# Patient Record
Sex: MALE | Race: BLACK OR AFRICAN AMERICAN | URBAN - METROPOLITAN AREA
[De-identification: names, ages, dates, MRNs, and addresses within clinical notes are randomized per-mention and may not be internally consistent; named-entity substitution may affect disease eponyms.]

---

## 2017-01-01 ENCOUNTER — INPATIENT (INPATIENT)
Facility: HOSPITAL | Age: 82
LOS: 3 days | Discharge: HOME HEALTH | End: 2017-01-05
Attending: INTERNAL MEDICINE | Admitting: INTERNAL MEDICINE
Payer: MEDICARE

## 2017-01-01 VITALS
HEART RATE: 131 BPM | TEMPERATURE: 98 F | DIASTOLIC BLOOD PRESSURE: 52 MMHG | OXYGEN SATURATION: 99 % | WEIGHT: 130.07 LBS | SYSTOLIC BLOOD PRESSURE: 97 MMHG | HEIGHT: 67 IN | RESPIRATION RATE: 18 BRPM

## 2017-01-01 DIAGNOSIS — Z92.89 PERSONAL HISTORY OF OTHER MEDICAL TREATMENT: Chronic | ICD-10-CM

## 2017-01-01 LAB
ACANTHOCYTES BLD QL SMEAR: SIGNIFICANT CHANGE UP
ALBUMIN SERPL ELPH-MCNC: 2.7 G/DL — LOW (ref 3.3–5)
ALP SERPL-CCNC: 80 U/L — SIGNIFICANT CHANGE UP (ref 40–120)
ALT FLD-CCNC: 27 U/L — SIGNIFICANT CHANGE UP (ref 12–78)
ANION GAP SERPL CALC-SCNC: 12 MMOL/L — SIGNIFICANT CHANGE UP (ref 5–17)
ANISOCYTOSIS BLD QL: SLIGHT — SIGNIFICANT CHANGE UP
APPEARANCE UR: ABNORMAL
AST SERPL-CCNC: 40 U/L — HIGH (ref 15–37)
BACTERIA # UR AUTO: ABNORMAL
BILIRUB SERPL-MCNC: 0.8 MG/DL — SIGNIFICANT CHANGE UP (ref 0.2–1.2)
BILIRUB UR-MCNC: NEGATIVE — SIGNIFICANT CHANGE UP
BUN SERPL-MCNC: 104 MG/DL — HIGH (ref 7–23)
BURR CELLS BLD QL SMEAR: PRESENT — SIGNIFICANT CHANGE UP
CALCIUM SERPL-MCNC: 9 MG/DL — SIGNIFICANT CHANGE UP (ref 8.5–10.1)
CHLORIDE SERPL-SCNC: 95 MMOL/L — LOW (ref 96–108)
CO2 SERPL-SCNC: 27 MMOL/L — SIGNIFICANT CHANGE UP (ref 22–31)
COLOR SPEC: ABNORMAL
CREAT SERPL-MCNC: 6.53 MG/DL — HIGH (ref 0.5–1.3)
DACRYOCYTES BLD QL SMEAR: SLIGHT — SIGNIFICANT CHANGE UP
DIFF PNL FLD: ABNORMAL
DOHLE BOD BLD QL SMEAR: PRESENT — SIGNIFICANT CHANGE UP
ELLIPTOCYTES BLD QL SMEAR: SLIGHT — SIGNIFICANT CHANGE UP
EPI CELLS # UR: SIGNIFICANT CHANGE UP
GLUCOSE SERPL-MCNC: 94 MG/DL — SIGNIFICANT CHANGE UP (ref 70–99)
GLUCOSE UR QL: NEGATIVE MG/DL — SIGNIFICANT CHANGE UP
HCT VFR BLD CALC: 32.3 % — LOW (ref 39–50)
HGB BLD-MCNC: 11.2 G/DL — LOW (ref 13–17)
HYPOCHROMIA BLD QL: SLIGHT — SIGNIFICANT CHANGE UP
HYPOSEGMENTATION: PRESENT — SIGNIFICANT CHANGE UP
KETONES UR-MCNC: NEGATIVE — SIGNIFICANT CHANGE UP
LACTATE SERPL-SCNC: 1.6 MMOL/L — SIGNIFICANT CHANGE UP (ref 0.7–2)
LEUKOCYTE ESTERASE UR-ACNC: ABNORMAL
LG PLATELETS BLD QL AUTO: SLIGHT — SIGNIFICANT CHANGE UP
LYMPHOCYTES # BLD AUTO: 6 % — LOW (ref 13–44)
MACROCYTES BLD QL: SLIGHT — SIGNIFICANT CHANGE UP
MCHC RBC-ENTMCNC: 29.9 PG — SIGNIFICANT CHANGE UP (ref 27–34)
MCHC RBC-ENTMCNC: 34.7 GM/DL — SIGNIFICANT CHANGE UP (ref 32–36)
MCV RBC AUTO: 86.1 FL — SIGNIFICANT CHANGE UP (ref 80–100)
MICROCYTES BLD QL: SIGNIFICANT CHANGE UP
MONOCYTES NFR BLD AUTO: 4 % — SIGNIFICANT CHANGE UP (ref 2–14)
NEUTROPHILS NFR BLD AUTO: 90 % — HIGH (ref 43–77)
NITRITE UR-MCNC: NEGATIVE — SIGNIFICANT CHANGE UP
OVALOCYTES BLD QL SMEAR: SLIGHT — SIGNIFICANT CHANGE UP
PH UR: 6.5 — SIGNIFICANT CHANGE UP (ref 4.8–8)
PLAT MORPH BLD: NORMAL — SIGNIFICANT CHANGE UP
PLATELET # BLD AUTO: 194 K/UL — SIGNIFICANT CHANGE UP (ref 150–400)
POLYCHROMASIA BLD QL SMEAR: SLIGHT — SIGNIFICANT CHANGE UP
POTASSIUM SERPL-MCNC: 5.3 MMOL/L — SIGNIFICANT CHANGE UP (ref 3.5–5.3)
POTASSIUM SERPL-SCNC: 5.3 MMOL/L — SIGNIFICANT CHANGE UP (ref 3.5–5.3)
PROT SERPL-MCNC: 7.1 GM/DL — SIGNIFICANT CHANGE UP (ref 6–8.3)
PROT UR-MCNC: 500 MG/DL
RBC # BLD: 3.75 M/UL — LOW (ref 4.2–5.8)
RBC # FLD: 14.1 % — SIGNIFICANT CHANGE UP (ref 11–15)
RBC BLD AUTO: ABNORMAL
RBC CASTS # UR COMP ASSIST: SIGNIFICANT CHANGE UP /HPF (ref 0–4)
SCHISTOCYTES BLD QL AUTO: SLIGHT — SIGNIFICANT CHANGE UP
SODIUM SERPL-SCNC: 134 MMOL/L — LOW (ref 135–145)
SP GR SPEC: 1.01 — SIGNIFICANT CHANGE UP (ref 1.01–1.02)
SPHEROCYTES BLD QL SMEAR: SLIGHT — SIGNIFICANT CHANGE UP
UROBILINOGEN FLD QL: NEGATIVE MG/DL — SIGNIFICANT CHANGE UP
WBC # BLD: 20 K/UL — HIGH (ref 3.8–10.5)
WBC # FLD AUTO: 20 K/UL — HIGH (ref 3.8–10.5)
WBC UR QL: SIGNIFICANT CHANGE UP /HPF (ref 0–5)

## 2017-01-01 PROCEDURE — 71010: CPT | Mod: 26

## 2017-01-01 PROCEDURE — 99285 EMERGENCY DEPT VISIT HI MDM: CPT

## 2017-01-01 RX ORDER — PIPERACILLIN AND TAZOBACTAM 4; .5 G/20ML; G/20ML
3.38 INJECTION, POWDER, LYOPHILIZED, FOR SOLUTION INTRAVENOUS ONCE
Qty: 0 | Refills: 0 | Status: COMPLETED | OUTPATIENT
Start: 2017-01-01 | End: 2017-01-01

## 2017-01-01 RX ORDER — VANCOMYCIN HCL 1 G
1000 VIAL (EA) INTRAVENOUS ONCE
Qty: 0 | Refills: 0 | Status: COMPLETED | OUTPATIENT
Start: 2017-01-01 | End: 2017-01-01

## 2017-01-01 RX ORDER — SODIUM CHLORIDE 9 MG/ML
2000 INJECTION INTRAMUSCULAR; INTRAVENOUS; SUBCUTANEOUS ONCE
Qty: 0 | Refills: 0 | Status: COMPLETED | OUTPATIENT
Start: 2017-01-01 | End: 2017-01-01

## 2017-01-01 RX ORDER — SODIUM CHLORIDE 9 MG/ML
1000 INJECTION INTRAMUSCULAR; INTRAVENOUS; SUBCUTANEOUS ONCE
Qty: 0 | Refills: 0 | Status: COMPLETED | OUTPATIENT
Start: 2017-01-01 | End: 2017-01-01

## 2017-01-01 RX ORDER — SODIUM CHLORIDE 9 MG/ML
1000 INJECTION INTRAMUSCULAR; INTRAVENOUS; SUBCUTANEOUS ONCE
Qty: 0 | Refills: 0 | Status: COMPLETED | OUTPATIENT
Start: 2017-01-01 | End: 2017-01-02

## 2017-01-01 RX ADMIN — PIPERACILLIN AND TAZOBACTAM 200 GRAM(S): 4; .5 INJECTION, POWDER, LYOPHILIZED, FOR SOLUTION INTRAVENOUS at 16:35

## 2017-01-01 RX ADMIN — SODIUM CHLORIDE 1000 MILLILITER(S): 9 INJECTION INTRAMUSCULAR; INTRAVENOUS; SUBCUTANEOUS at 16:35

## 2017-01-01 RX ADMIN — SODIUM CHLORIDE 1000 MILLILITER(S): 9 INJECTION INTRAMUSCULAR; INTRAVENOUS; SUBCUTANEOUS at 21:08

## 2017-01-01 RX ADMIN — Medication 250 MILLIGRAM(S): at 17:45

## 2017-01-01 NOTE — ED PROVIDER NOTE - PROGRESS NOTE DETAILS
Patient signed out by Dr. Nunn pending ICU consult. ICU recommend CT and repeat CBC. ICU will not take patient at this time. CT reviewed. Dr. Gilbert consulted. Dr. Sanches paged. Patient now admitted to medicine for further management.

## 2017-01-01 NOTE — ED ADULT NURSE NOTE - PMH
BPH (benign prostatic hypertrophy)    Cardiac anomaly    CHF (congestive heart failure)    Hyperlipidemia    Hypertension

## 2017-01-01 NOTE — ED PROVIDER NOTE - PHYSICAL EXAMINATION
Gen: Alert, NAD Head: NC, AT, PERRL, EOMI, normal lids/conjunctiva ENT: normal hearing, patent oropharynx without erythema/exudate, uvula midline Neck: +supple, no tenderness/meningismus/JVD, +Trachea midline Pulm: Bilateral BS, normal resp effort, no wheeze/stridor/retractions CV: RRR, no M/R/G, +dist pulses Abd: soft, NT/ND, +BS, no hepatosplenomegaly Mskel: no edema/erythema/cyanosis Skin: no rash Neuro: Alert, no sensory/motor deficits, CN 2-12 intact : indwelling jc with heamturia

## 2017-01-01 NOTE — ED PROVIDER NOTE - CARE PLAN
Principal Discharge DX:	UTI (urinary tract infection)  Secondary Diagnosis:	Sepsis  Secondary Diagnosis:	Renal insufficiency

## 2017-01-01 NOTE — ED PROVIDER NOTE - OBJECTIVE STATEMENT
Pertinent PMH/PSH/FHx/SHx and Review of Systems contained within:  Patient presents to the ED for hypotension (systolic 90's).  Per chart, PMH of BPH, HTN, HLD, CAD, chronic jc, CRI.  Patient si poor historian.  unable to attain more detailed ROS/PMH/PSH/FHx/SHx due to clinical condition.  VSS but bordelrine hypotensive.  Non toxic.  Well appearing. No aggravating or relieving factors. No other pertinent PMH.  No other pertinent PSH.  No other pertinent FHx.  No fever/chills, No photophobia/eye pain/changes in vision, No ear pain/sore throat/dysphagia, No chest pain/palpitations, no SOB/cough/wheeze/stridor, No abdominal pain, No N/V/D, no dysuria/frequency/discharge, No neck/back pain, no rash, no changes in neurological status/function.

## 2017-01-01 NOTE — ED PROVIDER NOTE - MEDICAL DECISION MAKING DETAILS
Patient presnt with borderline hypotension.  VSS. improvement with IVF.  UA with UTI.  Lab values c/w likely sepsis.  abx given.  d/w Dr. An and will admit. Patient presnt with borderline hypotension.  VSS. improvement with IVF.  UA with UTI.  Lab values c/w likely sepsis.  abx given.

## 2017-01-01 NOTE — ED ADULT NURSE NOTE - OBJECTIVE STATEMENT
Pt arrived with jc in placed from home with hematuria noted. Pt hypotensive upon arrival to ED, denies any pain at this time.

## 2017-01-02 DIAGNOSIS — E46 UNSPECIFIED PROTEIN-CALORIE MALNUTRITION: ICD-10-CM

## 2017-01-02 DIAGNOSIS — N28.9 DISORDER OF KIDNEY AND URETER, UNSPECIFIED: ICD-10-CM

## 2017-01-02 DIAGNOSIS — N47.8 OTHER DISORDERS OF PREPUCE: ICD-10-CM

## 2017-01-02 DIAGNOSIS — A41.9 SEPSIS, UNSPECIFIED ORGANISM: ICD-10-CM

## 2017-01-02 DIAGNOSIS — N39.0 URINARY TRACT INFECTION, SITE NOT SPECIFIED: ICD-10-CM

## 2017-01-02 DIAGNOSIS — R31.0 GROSS HEMATURIA: ICD-10-CM

## 2017-01-02 DIAGNOSIS — I10 ESSENTIAL (PRIMARY) HYPERTENSION: ICD-10-CM

## 2017-01-02 DIAGNOSIS — I50.9 HEART FAILURE, UNSPECIFIED: ICD-10-CM

## 2017-01-02 DIAGNOSIS — N40.0 BENIGN PROSTATIC HYPERPLASIA WITHOUT LOWER URINARY TRACT SYMPTOMS: ICD-10-CM

## 2017-01-02 DIAGNOSIS — N30.01 ACUTE CYSTITIS WITH HEMATURIA: ICD-10-CM

## 2017-01-02 DIAGNOSIS — N40.1 BENIGN PROSTATIC HYPERPLASIA WITH LOWER URINARY TRACT SYMPTOMS: ICD-10-CM

## 2017-01-02 DIAGNOSIS — E78.5 HYPERLIPIDEMIA, UNSPECIFIED: ICD-10-CM

## 2017-01-02 LAB
ALBUMIN SERPL ELPH-MCNC: 2.1 G/DL — LOW (ref 3.3–5)
ALP SERPL-CCNC: 66 U/L — SIGNIFICANT CHANGE UP (ref 40–120)
ALT FLD-CCNC: 21 U/L — SIGNIFICANT CHANGE UP (ref 12–78)
ANION GAP SERPL CALC-SCNC: 12 MMOL/L — SIGNIFICANT CHANGE UP (ref 5–17)
APTT BLD: 31.6 SEC — SIGNIFICANT CHANGE UP (ref 27.5–37.4)
AST SERPL-CCNC: 30 U/L — SIGNIFICANT CHANGE UP (ref 15–37)
BASOPHILS # BLD AUTO: 0.1 K/UL — SIGNIFICANT CHANGE UP (ref 0–0.2)
BASOPHILS NFR BLD AUTO: 0.4 % — SIGNIFICANT CHANGE UP (ref 0–2)
BILIRUB SERPL-MCNC: 0.7 MG/DL — SIGNIFICANT CHANGE UP (ref 0.2–1.2)
BUN SERPL-MCNC: 90 MG/DL — HIGH (ref 7–23)
CALCIUM SERPL-MCNC: 8.1 MG/DL — LOW (ref 8.5–10.1)
CHLORIDE SERPL-SCNC: 103 MMOL/L — SIGNIFICANT CHANGE UP (ref 96–108)
CO2 SERPL-SCNC: 23 MMOL/L — SIGNIFICANT CHANGE UP (ref 22–31)
CREAT ?TM UR-MCNC: 44 MG/DL — SIGNIFICANT CHANGE UP
CREAT SERPL-MCNC: 5.4 MG/DL — HIGH (ref 0.5–1.3)
CULTURE RESULTS: SIGNIFICANT CHANGE UP
EOSINOPHIL # BLD AUTO: 0.2 K/UL — SIGNIFICANT CHANGE UP (ref 0–0.5)
EOSINOPHIL NFR BLD AUTO: 1.4 % — SIGNIFICANT CHANGE UP (ref 0–6)
EOSINOPHIL NFR URNS MANUAL: POSITIVE
GLUCOSE SERPL-MCNC: 90 MG/DL — SIGNIFICANT CHANGE UP (ref 70–99)
HCT VFR BLD CALC: 25.8 % — LOW (ref 39–50)
HCT VFR BLD CALC: 25.9 % — LOW (ref 39–50)
HGB BLD-MCNC: 9.5 G/DL — LOW (ref 13–17)
HGB BLD-MCNC: 9.5 G/DL — LOW (ref 13–17)
INR BLD: 1.41 RATIO — HIGH (ref 0.88–1.16)
LACTATE SERPL-SCNC: 1.2 MMOL/L — SIGNIFICANT CHANGE UP (ref 0.7–2)
LYMPHOCYTES # BLD AUTO: 1.4 K/UL — SIGNIFICANT CHANGE UP (ref 1–3.3)
LYMPHOCYTES # BLD AUTO: 7.9 % — LOW (ref 13–44)
MCHC RBC-ENTMCNC: 30.6 PG — SIGNIFICANT CHANGE UP (ref 27–34)
MCHC RBC-ENTMCNC: 30.8 PG — SIGNIFICANT CHANGE UP (ref 27–34)
MCHC RBC-ENTMCNC: 36.6 GM/DL — HIGH (ref 32–36)
MCHC RBC-ENTMCNC: 36.9 GM/DL — HIGH (ref 32–36)
MCV RBC AUTO: 83.3 FL — SIGNIFICANT CHANGE UP (ref 80–100)
MCV RBC AUTO: 83.5 FL — SIGNIFICANT CHANGE UP (ref 80–100)
MONOCYTES # BLD AUTO: 1.1 K/UL — HIGH (ref 0–0.9)
MONOCYTES NFR BLD AUTO: 6.3 % — SIGNIFICANT CHANGE UP (ref 2–14)
NEUTROPHILS # BLD AUTO: 14.6 K/UL — HIGH (ref 1.8–7.4)
NEUTROPHILS NFR BLD AUTO: 84 % — HIGH (ref 43–77)
NT-PROBNP SERPL-SCNC: HIGH PG/ML (ref 0–450)
PLATELET # BLD AUTO: 152 K/UL — SIGNIFICANT CHANGE UP (ref 150–400)
PLATELET # BLD AUTO: 161 K/UL — SIGNIFICANT CHANGE UP (ref 150–400)
POTASSIUM SERPL-MCNC: 4.3 MMOL/L — SIGNIFICANT CHANGE UP (ref 3.5–5.3)
POTASSIUM SERPL-SCNC: 4.3 MMOL/L — SIGNIFICANT CHANGE UP (ref 3.5–5.3)
PROT ?TM UR-MCNC: 13 MG/DL — HIGH (ref 0–12)
PROT SERPL-MCNC: 5.6 GM/DL — LOW (ref 6–8.3)
PROT/CREAT UR-RTO: 0.3 RATIO — HIGH (ref 0–0.2)
PROTHROM AB SERPL-ACNC: 15.9 SEC — HIGH (ref 10–13.1)
RBC # BLD: 3.1 M/UL — LOW (ref 4.2–5.8)
RBC # BLD: 3.1 M/UL — LOW (ref 4.2–5.8)
RBC # FLD: 13.3 % — SIGNIFICANT CHANGE UP (ref 11–15)
RBC # FLD: 13.7 % — SIGNIFICANT CHANGE UP (ref 11–15)
SODIUM SERPL-SCNC: 138 MMOL/L — SIGNIFICANT CHANGE UP (ref 135–145)
SPECIMEN SOURCE: SIGNIFICANT CHANGE UP
WBC # BLD: 17.4 K/UL — HIGH (ref 3.8–10.5)
WBC # BLD: 20 K/UL — HIGH (ref 3.8–10.5)
WBC # FLD AUTO: 17.4 K/UL — HIGH (ref 3.8–10.5)
WBC # FLD AUTO: 20 K/UL — HIGH (ref 3.8–10.5)

## 2017-01-02 PROCEDURE — 99221 1ST HOSP IP/OBS SF/LOW 40: CPT

## 2017-01-02 PROCEDURE — 99223 1ST HOSP IP/OBS HIGH 75: CPT

## 2017-01-02 PROCEDURE — 74176 CT ABD & PELVIS W/O CONTRAST: CPT | Mod: 26

## 2017-01-02 RX ORDER — VANCOMYCIN HCL 1 G
500 VIAL (EA) INTRAVENOUS
Qty: 0 | Refills: 0 | Status: DISCONTINUED | OUTPATIENT
Start: 2017-01-02 | End: 2017-01-04

## 2017-01-02 RX ORDER — CLOPIDOGREL BISULFATE 75 MG/1
75 TABLET, FILM COATED ORAL DAILY
Qty: 0 | Refills: 0 | Status: DISCONTINUED | OUTPATIENT
Start: 2017-01-02 | End: 2017-01-05

## 2017-01-02 RX ORDER — ASPIRIN/CALCIUM CARB/MAGNESIUM 324 MG
81 TABLET ORAL DAILY
Qty: 0 | Refills: 0 | Status: DISCONTINUED | OUTPATIENT
Start: 2017-01-02 | End: 2017-01-05

## 2017-01-02 RX ORDER — MIDODRINE HYDROCHLORIDE 2.5 MG/1
10 TABLET ORAL THREE TIMES A DAY
Qty: 0 | Refills: 0 | Status: DISCONTINUED | OUTPATIENT
Start: 2017-01-02 | End: 2017-01-05

## 2017-01-02 RX ORDER — FLUTICASONE PROPIONATE AND SALMETEROL 50; 250 UG/1; UG/1
1 POWDER ORAL; RESPIRATORY (INHALATION)
Qty: 0 | Refills: 0 | Status: DISCONTINUED | OUTPATIENT
Start: 2017-01-02 | End: 2017-01-05

## 2017-01-02 RX ORDER — TAMSULOSIN HYDROCHLORIDE 0.4 MG/1
0.4 CAPSULE ORAL AT BEDTIME
Qty: 0 | Refills: 0 | Status: DISCONTINUED | OUTPATIENT
Start: 2017-01-02 | End: 2017-01-05

## 2017-01-02 RX ORDER — SODIUM CHLORIDE 9 MG/ML
1000 INJECTION INTRAMUSCULAR; INTRAVENOUS; SUBCUTANEOUS
Qty: 0 | Refills: 0 | Status: DISCONTINUED | OUTPATIENT
Start: 2017-01-02 | End: 2017-01-05

## 2017-01-02 RX ORDER — FERROUS SULFATE 325(65) MG
325 TABLET ORAL DAILY
Qty: 0 | Refills: 0 | Status: DISCONTINUED | OUTPATIENT
Start: 2017-01-02 | End: 2017-01-05

## 2017-01-02 RX ORDER — CARVEDILOL PHOSPHATE 80 MG/1
3.12 CAPSULE, EXTENDED RELEASE ORAL EVERY 12 HOURS
Qty: 0 | Refills: 0 | Status: DISCONTINUED | OUTPATIENT
Start: 2017-01-02 | End: 2017-01-02

## 2017-01-02 RX ORDER — ALBUTEROL 90 UG/1
2 AEROSOL, METERED ORAL EVERY 6 HOURS
Qty: 0 | Refills: 0 | Status: DISCONTINUED | OUTPATIENT
Start: 2017-01-02 | End: 2017-01-05

## 2017-01-02 RX ORDER — PIPERACILLIN AND TAZOBACTAM 4; .5 G/20ML; G/20ML
3.38 INJECTION, POWDER, LYOPHILIZED, FOR SOLUTION INTRAVENOUS EVERY 12 HOURS
Qty: 0 | Refills: 0 | Status: DISCONTINUED | OUTPATIENT
Start: 2017-01-02 | End: 2017-01-04

## 2017-01-02 RX ORDER — ATORVASTATIN CALCIUM 80 MG/1
40 TABLET, FILM COATED ORAL AT BEDTIME
Qty: 0 | Refills: 0 | Status: DISCONTINUED | OUTPATIENT
Start: 2017-01-02 | End: 2017-01-05

## 2017-01-02 RX ORDER — SODIUM CHLORIDE 9 MG/ML
500 INJECTION INTRAMUSCULAR; INTRAVENOUS; SUBCUTANEOUS ONCE
Qty: 0 | Refills: 0 | Status: COMPLETED | OUTPATIENT
Start: 2017-01-02 | End: 2017-01-02

## 2017-01-02 RX ORDER — VANCOMYCIN HCL 1 G
1000 VIAL (EA) INTRAVENOUS EVERY 24 HOURS
Qty: 0 | Refills: 0 | Status: DISCONTINUED | OUTPATIENT
Start: 2017-01-02 | End: 2017-01-02

## 2017-01-02 RX ORDER — FINASTERIDE 5 MG/1
5 TABLET, FILM COATED ORAL DAILY
Qty: 0 | Refills: 0 | Status: DISCONTINUED | OUTPATIENT
Start: 2017-01-02 | End: 2017-01-05

## 2017-01-02 RX ORDER — PANTOPRAZOLE SODIUM 20 MG/1
40 TABLET, DELAYED RELEASE ORAL
Qty: 0 | Refills: 0 | Status: DISCONTINUED | OUTPATIENT
Start: 2017-01-02 | End: 2017-01-05

## 2017-01-02 RX ADMIN — Medication 81 MILLIGRAM(S): at 13:17

## 2017-01-02 RX ADMIN — FLUTICASONE PROPIONATE AND SALMETEROL 1 DOSE(S): 50; 250 POWDER ORAL; RESPIRATORY (INHALATION) at 17:17

## 2017-01-02 RX ADMIN — FLUTICASONE PROPIONATE AND SALMETEROL 1 DOSE(S): 50; 250 POWDER ORAL; RESPIRATORY (INHALATION) at 09:36

## 2017-01-02 RX ADMIN — FINASTERIDE 5 MILLIGRAM(S): 5 TABLET, FILM COATED ORAL at 13:17

## 2017-01-02 RX ADMIN — MIDODRINE HYDROCHLORIDE 10 MILLIGRAM(S): 2.5 TABLET ORAL at 13:17

## 2017-01-02 RX ADMIN — PIPERACILLIN AND TAZOBACTAM 25 GRAM(S): 4; .5 INJECTION, POWDER, LYOPHILIZED, FOR SOLUTION INTRAVENOUS at 08:53

## 2017-01-02 RX ADMIN — SODIUM CHLORIDE 50 MILLILITER(S): 9 INJECTION INTRAMUSCULAR; INTRAVENOUS; SUBCUTANEOUS at 07:39

## 2017-01-02 RX ADMIN — MIDODRINE HYDROCHLORIDE 10 MILLIGRAM(S): 2.5 TABLET ORAL at 05:12

## 2017-01-02 RX ADMIN — MIDODRINE HYDROCHLORIDE 10 MILLIGRAM(S): 2.5 TABLET ORAL at 22:52

## 2017-01-02 RX ADMIN — SODIUM CHLORIDE 500 MILLILITER(S): 9 INJECTION INTRAMUSCULAR; INTRAVENOUS; SUBCUTANEOUS at 19:02

## 2017-01-02 RX ADMIN — ATORVASTATIN CALCIUM 40 MILLIGRAM(S): 80 TABLET, FILM COATED ORAL at 22:52

## 2017-01-02 RX ADMIN — Medication 325 MILLIGRAM(S): at 13:17

## 2017-01-02 RX ADMIN — CLOPIDOGREL BISULFATE 75 MILLIGRAM(S): 75 TABLET, FILM COATED ORAL at 13:17

## 2017-01-02 RX ADMIN — TAMSULOSIN HYDROCHLORIDE 0.4 MILLIGRAM(S): 0.4 CAPSULE ORAL at 22:52

## 2017-01-02 RX ADMIN — SODIUM CHLORIDE 1000 MILLILITER(S): 9 INJECTION INTRAMUSCULAR; INTRAVENOUS; SUBCUTANEOUS at 05:15

## 2017-01-02 RX ADMIN — PIPERACILLIN AND TAZOBACTAM 25 GRAM(S): 4; .5 INJECTION, POWDER, LYOPHILIZED, FOR SOLUTION INTRAVENOUS at 21:47

## 2017-01-02 RX ADMIN — PANTOPRAZOLE SODIUM 40 MILLIGRAM(S): 20 TABLET, DELAYED RELEASE ORAL at 09:35

## 2017-01-02 NOTE — H&P ADULT. - ASSESSMENT
Patient is 89 yr old Black male with PMH of  BPH, ARF, chronic Salcido catheter, HTN, CAD 3 stents, CHF recent Echo 11/4/2016 EF 45-50% with grade II diastolic dysfunction admitted for Acute on Chronic Renal Failure with possible ATN,  UTI 2/2 indwell catheter

## 2017-01-02 NOTE — CONSULT NOTE ADULT - ASSESSMENT
89 Y/O male w/ hematuria, elevated WBC, lactate of 1.6 89 Y/O male w/ hematuria, elevated WBC, lactate of 1.6, worsening renal function, systolic BP in the 90s.

## 2017-01-02 NOTE — H&P ADULT. - NEGATIVE ENMT SYMPTOMS
no tinnitus/no nasal discharge/no nasal congestion/no vertigo/no sinus symptoms/no hearing difficulty/no ear pain/no nasal obstruction

## 2017-01-02 NOTE — H&P ADULT. - NEGATIVE GASTROINTESTINAL SYMPTOMS
no change in bowel habits/no nausea/no vomiting/no flatulence/no diarrhea/no constipation/no abdominal pain

## 2017-01-02 NOTE — H&P ADULT. - PROBLEM SELECTOR PLAN 2
uti 2/2 indwell catheter with Hematuria   consult Dr ernesto calderón and vancomycin uti 2/2 indwell catheter with Hematuria   consult Dr ernesto calderón and vancomycin  consult Dr middleton uti 2/2 indwell catheter with Hematuria x 1 day  consult Dr orozco  IV Abx zosyn and vancomycin  consult Dr middleton

## 2017-01-02 NOTE — H&P ADULT. - PROBLEM SELECTOR PLAN 6
midodrine 10 mg tid  coreg 3.125 with parameter h/o HTN, but hypotensive on arrival, s/p 4 L IVF  midodrine 10 mg tid  coreg 3.125 with parameter

## 2017-01-02 NOTE — H&P ADULT. - NEGATIVE OPHTHALMOLOGIC SYMPTOMS
no discharge L/no blurred vision L/no photophobia/no discharge R/no lacrimation R/no diplopia/no lacrimation L/no blurred vision R

## 2017-01-02 NOTE — H&P ADULT. - HISTORY OF PRESENT ILLNESS
Patient is 89 yr old Black male with PMH of  BPH, ARF, chronic Jc catheter, HTN, CAD 3 stents, CHF recent Echo 11/4/2016 EF 45-50% with grade II diastolic dysfunction  presenting with hypotension systolic 90's and   generally feeling "not good" but cannot describe this specifically.  pt's jc found with  multiple clots and hematuria. Patient was recently admitted to [place] 11/22-11 Patient is 89 yr old Black male with PMH of  BPH, ARF, chronic Jc catheter, HTN, CAD 3 stents, CHF recent Echo 11/4/2016 EF 45-50% with grade II diastolic dysfunction  presenting with hypotension systolic 90's and   generally feeling "not good" but cannot describe this specifically.  pt's jc found with  multiple clots and hematuria. Patient was recently admitted to [place] 11/21-11/23. Patient is a poor historian and unable to elaborate more on how he is feeling. Patient is very thin with BMI 20 . Patient denies nausea, vomiting, diarrhea, constipation, abd pain, cough, cold , sob, headache, blurry vision or cp or palp.  While in the Er, patient received 4 liters of NS , wbc 20 lactate 0.8 , worsening bun/creat 104/6.53 compared to 11/21 55/3.91 , , UA protein, TNTC rbc and wbcs, leukocyte esterase, Blood culture x 2 and Urine culture sent, IV zosyn and vancomycin given. Consults ID Dr Wallace . Dr Gilbert and Dr Sanches called by Er Doc Zaheer who agreed to see the patient Patient is 89 yr old Black male with PMH of  BPH, ARF, chronic Jc catheter, HTN, CAD 3 stents, CHF recent Echo 11/4/2016 EF 45-50% with grade II diastolic dysfunction  presenting with hypotension systolic 90's and   generally feeling "not good" but cannot describe this specifically.  pt's jc found with  multiple clots and hematuria. Patient was recently admitted to [place] 11/21-11/23. Patient is a poor historian and unable to elaborate more on how he is feeling. Patient is very thin with BMI 20 . Patient denies nausea, vomiting, diarrhea, constipation, abd pain, cough, cold , sob, headache, blurry vision or cp or palp.  While in the Er, patient received 4 liters of NS , wbc 20 lactate 1.6, worsening bun/creat 104/6.53 compared to 11/21 55/3.91 , , UA protein, TNTC rbc and wbcs, leukocyte esterase, Blood culture x 2 and Urine culture sent, IV zosyn and vancomycin given. Consults ID Dr Wallace . Dr Gilbert and Dr Sanches called by Er Doc Zaheer who agreed to see the patient.

## 2017-01-02 NOTE — H&P ADULT. - PROBLEM SELECTOR PLAN 8
likely secondary to UTI  will give broad spectrum coverage for gram negative bacteria, given complicated UTI  Gentle IV hydration (received 4 L IVF in ED already)  Midodorine to mainatain BP  IV Abx

## 2017-01-02 NOTE — CONSULT NOTE ADULT - PROBLEM SELECTOR RECOMMENDATION 9
Followup CT and cytology
1. continue IV fluid replacement as needed  2. recommend renal consult  3. repeat labs, replace electrolytes as needed  4. continue abx coverage   5. Pt presently not a candidate for ICU. Please call if there are any further concerns or it pt condition worsens.
likely sec to complicated UTI sec to indwelling jc catheter(changed in ER)  -Cont Vanco per level  -Cont Zosyn  -F/U on c/s

## 2017-01-02 NOTE — H&P ADULT. - PROBLEM SELECTOR PLAN 1
acute on chronic ARF possible ATN with worsen Bun/creat  repeat cbc, cmp,   consult Dr Gilbert: Consult Dr Sanches

## 2017-01-02 NOTE — H&P ADULT. - RS GEN PE MLT RESP DETAILS PC
airway patent/breath sounds equal/respirations non-labored/no wheezes/clear to auscultation bilaterally/no rales/no chest wall tenderness/no rhonchi/no intercostal retractions/good air movement

## 2017-01-02 NOTE — H&P ADULT. - MUSCULOSKELETAL
details… detailed exam ROM intact/no joint warmth/no joint erythema/no calf tenderness/no joint swelling

## 2017-01-03 DIAGNOSIS — N17.0 ACUTE KIDNEY FAILURE WITH TUBULAR NECROSIS: ICD-10-CM

## 2017-01-03 DIAGNOSIS — N17.9 ACUTE KIDNEY FAILURE, UNSPECIFIED: ICD-10-CM

## 2017-01-03 DIAGNOSIS — N18.4 CHRONIC KIDNEY DISEASE, STAGE 4 (SEVERE): ICD-10-CM

## 2017-01-03 DIAGNOSIS — I10 ESSENTIAL (PRIMARY) HYPERTENSION: ICD-10-CM

## 2017-01-03 DIAGNOSIS — T83.511A INFECTION AND INFLAMMATORY REACTION DUE TO INDWELLING URETHRAL CATHETER, INITIAL ENCOUNTER: ICD-10-CM

## 2017-01-03 LAB
ANION GAP SERPL CALC-SCNC: 11 MMOL/L — SIGNIFICANT CHANGE UP (ref 5–17)
ANION GAP SERPL CALC-SCNC: 12 MMOL/L — SIGNIFICANT CHANGE UP (ref 5–17)
BASOPHILS # BLD AUTO: 0 K/UL — SIGNIFICANT CHANGE UP (ref 0–0.2)
BASOPHILS NFR BLD AUTO: 0.2 % — SIGNIFICANT CHANGE UP (ref 0–2)
BUN SERPL-MCNC: 77 MG/DL — HIGH (ref 7–23)
BUN SERPL-MCNC: 79 MG/DL — HIGH (ref 7–23)
CALCIUM SERPL-MCNC: 8.3 MG/DL — LOW (ref 8.5–10.1)
CALCIUM SERPL-MCNC: 8.3 MG/DL — LOW (ref 8.5–10.1)
CHLORIDE SERPL-SCNC: 102 MMOL/L — SIGNIFICANT CHANGE UP (ref 96–108)
CHLORIDE SERPL-SCNC: 103 MMOL/L — SIGNIFICANT CHANGE UP (ref 96–108)
CO2 SERPL-SCNC: 24 MMOL/L — SIGNIFICANT CHANGE UP (ref 22–31)
CO2 SERPL-SCNC: 25 MMOL/L — SIGNIFICANT CHANGE UP (ref 22–31)
CREAT SERPL-MCNC: 4.73 MG/DL — HIGH (ref 0.5–1.3)
CREAT SERPL-MCNC: 5.12 MG/DL — HIGH (ref 0.5–1.3)
EOSINOPHIL # BLD AUTO: 0.3 K/UL — SIGNIFICANT CHANGE UP (ref 0–0.5)
EOSINOPHIL NFR BLD AUTO: 2.7 % — SIGNIFICANT CHANGE UP (ref 0–6)
GLUCOSE SERPL-MCNC: 110 MG/DL — HIGH (ref 70–99)
GLUCOSE SERPL-MCNC: 80 MG/DL — SIGNIFICANT CHANGE UP (ref 70–99)
HCT VFR BLD CALC: 27.4 % — LOW (ref 39–50)
HGB BLD-MCNC: 9.5 G/DL — LOW (ref 13–17)
LYMPHOCYTES # BLD AUTO: 1.3 K/UL — SIGNIFICANT CHANGE UP (ref 1–3.3)
LYMPHOCYTES # BLD AUTO: 10.1 % — LOW (ref 13–44)
MAGNESIUM SERPL-MCNC: 2.1 MG/DL — SIGNIFICANT CHANGE UP (ref 1.8–2.4)
MCHC RBC-ENTMCNC: 29.8 PG — SIGNIFICANT CHANGE UP (ref 27–34)
MCHC RBC-ENTMCNC: 34.6 GM/DL — SIGNIFICANT CHANGE UP (ref 32–36)
MCV RBC AUTO: 86 FL — SIGNIFICANT CHANGE UP (ref 80–100)
MONOCYTES # BLD AUTO: 0.9 K/UL — SIGNIFICANT CHANGE UP (ref 0–0.9)
MONOCYTES NFR BLD AUTO: 7 % — SIGNIFICANT CHANGE UP (ref 2–14)
NEUTROPHILS # BLD AUTO: 9.6 K/UL — HIGH (ref 1.8–7.4)
NEUTROPHILS NFR BLD AUTO: 79.4 % — HIGH (ref 43–77)
PHOSPHATE SERPL-MCNC: 2.9 MG/DL — SIGNIFICANT CHANGE UP (ref 2.5–4.5)
PLATELET # BLD AUTO: 186 K/UL — SIGNIFICANT CHANGE UP (ref 150–400)
POTASSIUM SERPL-MCNC: 4 MMOL/L — SIGNIFICANT CHANGE UP (ref 3.5–5.3)
POTASSIUM SERPL-MCNC: 4.3 MMOL/L — SIGNIFICANT CHANGE UP (ref 3.5–5.3)
POTASSIUM SERPL-SCNC: 4 MMOL/L — SIGNIFICANT CHANGE UP (ref 3.5–5.3)
POTASSIUM SERPL-SCNC: 4.3 MMOL/L — SIGNIFICANT CHANGE UP (ref 3.5–5.3)
RBC # BLD: 3.18 M/UL — LOW (ref 4.2–5.8)
RBC # FLD: 13.6 % — SIGNIFICANT CHANGE UP (ref 11–15)
SODIUM SERPL-SCNC: 138 MMOL/L — SIGNIFICANT CHANGE UP (ref 135–145)
SODIUM SERPL-SCNC: 139 MMOL/L — SIGNIFICANT CHANGE UP (ref 135–145)
WBC # BLD: 12.1 K/UL — HIGH (ref 3.8–10.5)
WBC # FLD AUTO: 12.1 K/UL — HIGH (ref 3.8–10.5)

## 2017-01-03 PROCEDURE — 99233 SBSQ HOSP IP/OBS HIGH 50: CPT

## 2017-01-03 PROCEDURE — 99232 SBSQ HOSP IP/OBS MODERATE 35: CPT

## 2017-01-03 RX ADMIN — PANTOPRAZOLE SODIUM 40 MILLIGRAM(S): 20 TABLET, DELAYED RELEASE ORAL at 05:10

## 2017-01-03 RX ADMIN — Medication 81 MILLIGRAM(S): at 11:12

## 2017-01-03 RX ADMIN — FLUTICASONE PROPIONATE AND SALMETEROL 1 DOSE(S): 50; 250 POWDER ORAL; RESPIRATORY (INHALATION) at 17:25

## 2017-01-03 RX ADMIN — Medication 325 MILLIGRAM(S): at 11:12

## 2017-01-03 RX ADMIN — PIPERACILLIN AND TAZOBACTAM 25 GRAM(S): 4; .5 INJECTION, POWDER, LYOPHILIZED, FOR SOLUTION INTRAVENOUS at 09:38

## 2017-01-03 RX ADMIN — FINASTERIDE 5 MILLIGRAM(S): 5 TABLET, FILM COATED ORAL at 11:12

## 2017-01-03 RX ADMIN — CLOPIDOGREL BISULFATE 75 MILLIGRAM(S): 75 TABLET, FILM COATED ORAL at 11:12

## 2017-01-03 RX ADMIN — ATORVASTATIN CALCIUM 40 MILLIGRAM(S): 80 TABLET, FILM COATED ORAL at 21:18

## 2017-01-03 RX ADMIN — TAMSULOSIN HYDROCHLORIDE 0.4 MILLIGRAM(S): 0.4 CAPSULE ORAL at 21:18

## 2017-01-03 RX ADMIN — MIDODRINE HYDROCHLORIDE 10 MILLIGRAM(S): 2.5 TABLET ORAL at 21:18

## 2017-01-03 RX ADMIN — MIDODRINE HYDROCHLORIDE 10 MILLIGRAM(S): 2.5 TABLET ORAL at 05:10

## 2017-01-03 RX ADMIN — PIPERACILLIN AND TAZOBACTAM 25 GRAM(S): 4; .5 INJECTION, POWDER, LYOPHILIZED, FOR SOLUTION INTRAVENOUS at 21:18

## 2017-01-03 RX ADMIN — FLUTICASONE PROPIONATE AND SALMETEROL 1 DOSE(S): 50; 250 POWDER ORAL; RESPIRATORY (INHALATION) at 05:09

## 2017-01-03 RX ADMIN — MIDODRINE HYDROCHLORIDE 10 MILLIGRAM(S): 2.5 TABLET ORAL at 14:11

## 2017-01-03 NOTE — OCCUPATIONAL THERAPY INITIAL EVALUATION ADULT - RANGE OF MOTION EXAMINATION, LOWER EXTREMITY
bilateral LE Active Assistive ROM was WNL (within normal limits)/bilateral LE Active ROM was WFL  (within functional limits)

## 2017-01-03 NOTE — PHYSICAL THERAPY INITIAL EVALUATION ADULT - RANGE OF MOTION EXAMINATION, REHAB EVAL
bilateral lower extremity was ROM was WNL (within normal limits)/bilateral upper extremity ROM was WNL (within normal limits)

## 2017-01-03 NOTE — PROGRESS NOTE ADULT - SUBJECTIVE AND OBJECTIVE BOX
CHIEF COMPLAINT/INTERVAL HISTORY:    Patient is a 89y old  Male who presents with a chief complaint of Patient 89 yr old black male complaining of malaise and not feeling good (2017 03:18)      HPI:  Patient is 89 yr old Black male with PMH of  BPH, ARF, chronic Jc catheter, HTN, CAD 3 stents, CHF recent Echo 2016 EF 45-50% with grade II diastolic dysfunction  presenting with hypotension systolic 90's and   generally feeling "not good" but cannot describe this specifically.  pt's jc found with  multiple clots and hematuria. Patient was recently admitted to [place] -. Patient is a poor historian and unable to elaborate more on how he is feeling. Patient is very thin with BMI 20 . Patient denies nausea, vomiting, diarrhea, constipation, abd pain, cough, cold , sob, headache, blurry vision or cp or palp.  While in the Er, patient received 4 liters of NS , wbc 20 lactate 1.6, worsening bun/creat 104/6.53 compared to  55/3.91 , , UA protein, TNTC rbc and wbcs, leukocyte esterase, Blood culture x 2 and Urine culture sent, IV zosyn and vancomycin given. Consults ID Dr Wallace . Dr Gilbert and Dr Sanches called by Er Doc Zaheer who agreed to see the patient. (2017 03:18)    Overnight issues  patient feeling better with no pain or fatigue   infectious disease, renal and urology consults appreciated   SUBJECTIVE & OBJECTIVE: Pt seen and examined at bedside.   ROS:  CONSTITUTIONAL: No fever, weight loss, or fatigue  EYES: No eye pain, visual disturbances, or discharge  ENMT:  No difficulty hearing, tinnitus, vertigo; No sinus or throat pain  NECK: No pain or stiffness  RESPIRATORY: No cough, wheezing, chills or hemoptysis; No shortness of breath  CARDIOVASCULAR: No chest pain, palpitations, dizziness, or leg swelling  GASTROINTESTINAL: No abdominal or epigastric pain. No nausea, vomiting, or hematemesis; No diarrhea or constipation. No melena or hematochezia.  GENITOURINARY: No dysuria, frequency, hematuria, or incontinence  NEUROLOGICAL: No headaches, memory loss, loss of strength, numbness, or tremors  SKIN: No itching, burning, rashes, or lesions   LYMPH NODES: No enlarged glands  ENDOCRINE: No heat or cold intolerance; No hair loss  MUSCULOSKELETAL: No joint pain or swelling; No muscle, back, or extremity pain  PSYCHIATRIC: No depression, anxiety, mood swings, or difficulty sleeping  HEME/LYMPH: No easy bruising, or bleeding gums  ALLERGY AND IMMUNOLOGIC: No hives or eczema  ICU Vital Signs Last 24 Hrs  T(C): 36.6, Max: 37 ( @ 05:51)  T(F): 97.8, Max: 98.6 ( @ 05:51)  HR: 61 (60 - 115)  BP: 88/55 (73/42 - 103/55)  BP(mean): --  ABP: --  ABP(mean): --  RR: 17 (15 - 18)  SpO2: 96% (95% - 100%)        MEDICATIONS  (STANDING):  ferrous    sulfate 325milliGRAM(s) Oral daily  fluticasone / salmeterol 250-50 MICROgram(s) Diskus 1Dose(s) Inhalation two times a day  pantoprazole    Tablet 40milliGRAM(s) Oral before breakfast  aspirin enteric coated 81milliGRAM(s) Oral daily  clopidogrel Tablet 75milliGRAM(s) Oral daily  atorvastatin 40milliGRAM(s) Oral at bedtime  tamsulosin 0.4milliGRAM(s) Oral at bedtime  finasteride 5milliGRAM(s) Oral daily  piperacillin/tazobactam IVPB. 3.375Gram(s) IV Intermittent every 12 hours  midodrine 10milliGRAM(s) Oral three times a day  vancomycin  IVPB 500milliGRAM(s) IV Intermittent every 72 hours  sodium chloride 0.9%. 1000milliLiter(s) IV Continuous <Continuous>    MEDICATIONS  (PRN):  ALBUTerol    90 MICROgram(s) HFA Inhaler 2Puff(s) Inhalation every 6 hours PRN Shortness of Breath and/or Wheezing        PHYSICAL EXAM:    GENERAL: NAD, well-groomed, well-developed  HEAD:  Atraumatic, Normocephalic  EYES: EOMI, PERRLA, conjunctiva and sclera clear  ENMT: Moist mucous membranes  NECK: Supple, No JVD  NERVOUS SYSTEM:  Alert & Oriented X3, Motor Strength 5/5 B/L upper and lower extremities; DTRs 2+ intact and symmetric  CHEST/LUNG: Clear to auscultation bilaterally; No rales, rhonchi, wheezing, or rubs  HEART: Regular rate and rhythm; No murmurs, rubs, or gallops  ABDOMEN: Soft, Nontender, Nondistended; Bowel sounds present  EXTREMITIES:  2+ Peripheral Pulses, No clubbing, cyanosis, or edema    LABS:                        9.5    12.1  )-----------( 186      ( 2017 10:21 )             27.4     2017 10:21    139    |  103    |  79     ----------------------------<  80     4.0     |  24     |  5.12     Ca    8.3        2017 10:21    TPro  5.6    /  Alb  2.1    /  TBili  0.7    /  DBili  x      /  AST  30     /  ALT  21     /  AlkPhos  66     2017 07:20    PT/INR - ( 2017 07:20 )   PT: 15.9 sec;   INR: 1.41 ratio         PTT - ( 2017 07:20 )  PTT:31.6 sec  Urinalysis Basic - ( 2017 18:05 )    Color: Red / Appearance: bloody / S.015 / pH: x  Gluc: x / Ketone: Negative  / Bili: Negative / Urobili: Negative mg/dL   Blood: x / Protein: 500 mg/dL / Nitrite: Negative   Leuk Esterase: Moderate / RBC: TNTC /HPF / WBC TNTC /HPF   Sq Epi: x / Non Sq Epi: Few / Bacteria: TNTC        CAPILLARY BLOOD GLUCOSE      RECENT CULTURES:      RADIOLOGY & ADDITIONAL TESTS:  Imaging Personally Reviewed:  [ ] YES      Consultant(s) Notes Reviewed:  [ ] YES     Care Discussed with [ ] Consultants [X ] Patient [ ] Family  [x ]    [x ]  Other; RN  HEALTH ISSUES - PROBLEM Dx:  Redundant prepuce and phimosis: Redundant prepuce and phimosis  Acute cystitis with hematuria: Acute cystitis with hematuria  Benign prostatic hypertrophy with urinary retention: Benign prostatic hypertrophy with urinary retention  Gross hematuria: Gross hematuria  Sepsis, due to unspecified organism: Sepsis, due to unspecified organism  Sepsis: Sepsis  Malnourished: Malnourished  Hypertension: Hypertension  BPH (benign prostatic hypertrophy): BPH (benign prostatic hypertrophy)  Hyperlipidemia: Hyperlipidemia  CHF (congestive heart failure): CHF (congestive heart failure)  UTI (urinary tract infection): UTI (urinary tract infection)  Renal insufficiency: Renal insufficiency        DVT/GI ppx  Discussed with pt @ bedside

## 2017-01-03 NOTE — OCCUPATIONAL THERAPY INITIAL EVALUATION ADULT - PLANNED THERAPY INTERVENTIONS, OT EVAL
motor coordination training/ROM/IADL retraining/cognitive, visual perceptual/neuromuscular re-education/massage/strengthening/stretching/transfer training/joint mobilization/fine motor coordination training/bed mobility training/energy conservation techniques/balance training/parent/caregiver training.../ADL retraining

## 2017-01-03 NOTE — OCCUPATIONAL THERAPY INITIAL EVALUATION ADULT - GENERAL OBSERVATIONS, REHAB EVAL
Zachary was encountered in bed on oxygen via nasal canula. Pt is on cardiac monitor and was observed with Salcido cath in place. Pt was AA&Ox2. Pt required encouragement to participate in evaluation procedure. Pt moved all extremities but has decreased dexterity and fine motor skills in BUE.

## 2017-01-03 NOTE — PHYSICAL THERAPY INITIAL EVALUATION ADULT - MODIFIED CLINICAL TEST OF SENSORY INTEGRATION IN BALANCE TEST
Bowels Score __5_, Bladder Score _5__, Grooming Score _0__, Toilet Use Score _5__, Feeding Score __5_, Transfer Score __10_, Mobility Score _10__, Dressing Score __5_, Stairs Score __0_, Bathing Score __0_,     Total Score _45__

## 2017-01-03 NOTE — DIETITIAN INITIAL EVALUATION ADULT. - PROBLEM SELECTOR PLAN 2
uti 2/2 indwell catheter with Hematuria x 1 day  consult Dr orozco  IV Abx zosyn and vancomycin  consult Dr middleton

## 2017-01-03 NOTE — PROGRESS NOTE ADULT - SUBJECTIVE AND OBJECTIVE BOX
89y Male admitted with URINARY TRACT INFECTION, SEPSIS, RENAL INSUFFICIENCY  SEPTIC    Patient seen and examined bedside resting comfortably.  No complaints offered.     T(F): 97.8, Max: 98.6 (01-03 @ 05:51)  HR: 61 (60 - 115)  BP: 88/55 (73/42 - 103/55)  RR: 17 (15 - 18)  SpO2: 96% (95% - 100%)  Wt(kg): --  CAPILLARY BLOOD GLUCOSE      PHYSICAL EXAM:  General: NAD, WDWN.   Neuro:  Alert & oriented x 3  HEENT: NCAT, EOMI, conjunctiva clear  CV: +S1+S2 regular rate and rhythm  Lung: clear to ausculation bilaterally, respirations nonlabored, good inspiratory effort  Abdomen: soft, NTND. Normactive BS  Extremities: no pedal edema or calf tenderness noted   : No CVA or SP tenderness Salcido in SITU with 2850l/24hrs of clear urine    LABS:                        9.5    12.1  )-----------( 186      ( 03 Jan 2017 10:21 )             27.4     03 Jan 2017 10:21    139    |  103    |  79     ----------------------------<  80     4.0     |  24     |  5.12     Ca    8.3        03 Jan 2017 10:21    TPro  5.6    /  Alb  2.1    /  TBili  0.7    /  DBili  x      /  AST  30     /  ALT  21     /  AlkPhos  66     02 Jan 2017 07:20    PT/INR - ( 02 Jan 2017 07:20 )   PT: 15.9 sec;   INR: 1.41 ratio      PTT - ( 02 Jan 2017 07:20 )  PTT:31.6 sec      EXAM:  CT RENAL STONE HUNT                            PROCEDURE DATE:  01/02/2017        INTERPRETATION:  CT OF THE ABDOMEN AND PELVIS DATED 1/2/2017.    CLINICAL INFORMATION:  89-year-old male, septic, evaluate for renal   stones.    TECHNIQUE: Axial CT images are obtained from the lung bases through the   pubic symphysis without the administration of oral or intravenous   contrast.  Images are reformatted in the sagittal and coronal planes.    The study is correlated with a prior exam from November 22, 2016.    FINDINGS:    There are trace bilateral pleural effusions and bibasilar dependent   atelectasis. The heart is borderline enlarged. There is a trace   pericardial effusion.    The unenhanced liver, gallbladder, biliary tree, pancreas, spleen, and   adrenal glands are unremarkable apart from cholelithiasis and nonspecific   gallbladder wall edema. The kidneys are symmetric in size. There is no   hydroureteronephrosis or obstructing urinary tract calculus. There are   small cysts bilateral renal cysts. The urinary bladder is decompressed by   a Salcido catheter. The prostate gland is mildly enlarged.    There are no dilated loops of large or small bowel. Colonic diverticula   are noted. There is generalized mesenteric edema. There is no   pneumoperitoneum.    There is no bulky abdominal or pelvic lymphadenopathy.    There is severe atherosclerotic calcification of the aortoiliac tree. The   abdominal aorta is normal in caliber.    There is generalized soft tissue edema.    There are multilevel degenerative changes of the spine and a mild   degenerative dextroscoliosis.      IMPRESSION:    No evidence of obstructive uropathy.        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

## 2017-01-03 NOTE — DIETITIAN INITIAL EVALUATION ADULT. - NS AS NUTRI INTERV MEALS SNACK
Continue c Renal diet; [facility] soft altered consistency + 60 g protein restriction/Diets modified for specific foods and ingredients/Texture-modified diet

## 2017-01-03 NOTE — PROGRESS NOTE ADULT - SUBJECTIVE AND OBJECTIVE BOX
Patient seen in follow up for ROSAURA, CKD  4; appears comfortable, no distress.    MEDICATIONS  (STANDING):  ferrous    sulfate 325milliGRAM(s) Oral daily  fluticasone / salmeterol 250-50 MICROgram(s) Diskus 1Dose(s) Inhalation two times a day  pantoprazole    Tablet 40milliGRAM(s) Oral before breakfast  aspirin enteric coated 81milliGRAM(s) Oral daily  clopidogrel Tablet 75milliGRAM(s) Oral daily  atorvastatin 40milliGRAM(s) Oral at bedtime  tamsulosin 0.4milliGRAM(s) Oral at bedtime  finasteride 5milliGRAM(s) Oral daily  piperacillin/tazobactam IVPB. 3.375Gram(s) IV Intermittent every 12 hours  midodrine 10milliGRAM(s) Oral three times a day  vancomycin  IVPB 500milliGRAM(s) IV Intermittent every 72 hours  sodium chloride 0.9%. 1000milliLiter(s) IV Continuous <Continuous>    MEDICATIONS  (PRN):  ALBUTerol    90 MICROgram(s) HFA Inhaler 2Puff(s) Inhalation every 6 hours PRN Shortness of Breath and/or Wheezing    PHYSICAL EXAM:      T(C): 36.6, Max: 37 (01-03 @ 05:51)  HR: 61 (60 - 115)  BP: 88/55 (73/42 - 103/55)  RR: 17 (15 - 18)  SpO2: 96% (95% - 100%)  Wt(kg): --  Respiratory: clear anteriorly, decreased BS at bases  Cardiovascular: S1 S2  Gastrointestinal: soft NT ND +BS  Extremities:   1 + edema                                    9.5    12.1  )-----------( 186      ( 03 Jan 2017 10:21 )             27.4     03 Jan 2017 10:21    139    |  103    |  79     ----------------------------<  80     4.0     |  24     |  5.12     Ca    8.3        03 Jan 2017 10:21    TPro  5.6    /  Alb  2.1    /  TBili  0.7    /  DBili  x      /  AST  30     /  ALT  21     /  AlkPhos  66     02 Jan 2017 07:20      LIVER FUNCTIONS - ( 02 Jan 2017 07:20 )  Alb: 2.1 g/dL / Pro: 5.6 gm/dL / ALK PHOS: 66 U/L / ALT: 21 U/L / AST: 30 U/L / GGT: x             Assessment and Plan  ROSAURA, CKD 4 pre renal azotemia, risk for ischemic ATN, non oliguric, improving.  Continue current plan.

## 2017-01-03 NOTE — OCCUPATIONAL THERAPY INITIAL EVALUATION ADULT - SOCIAL CONCERNS
Complex psychosocial needs/coping issues/Pt is experiencing separation from family due to new environment and new caregivers.

## 2017-01-03 NOTE — PHYSICAL THERAPY INITIAL EVALUATION ADULT - IMPAIRMENTS FOUND, PT EVAL
fine motor/ergonomics and body mechanics/integumentary integrity/gross motor/neuromotor development and sensory integration/gait, locomotion, and balance/joint integrity and mobility/muscle strength/poor safety awareness/aerobic capacity/endurance/posture

## 2017-01-03 NOTE — PROGRESS NOTE ADULT - SUBJECTIVE AND OBJECTIVE BOX
Patient is a 89y old  Male who presents with a chief complaint of Patient 89 yr old black male complaining of malaise and not feeling good (02 Jan 2017 03:18)      INTERVAL HPI / OVERNIGHT EVENTS:    MEDICATIONS  (STANDING):  ferrous    sulfate 325milliGRAM(s) Oral daily  fluticasone / salmeterol 250-50 MICROgram(s) Diskus 1Dose(s) Inhalation two times a day  pantoprazole    Tablet 40milliGRAM(s) Oral before breakfast  aspirin enteric coated 81milliGRAM(s) Oral daily  clopidogrel Tablet 75milliGRAM(s) Oral daily  atorvastatin 40milliGRAM(s) Oral at bedtime  tamsulosin 0.4milliGRAM(s) Oral at bedtime  finasteride 5milliGRAM(s) Oral daily  piperacillin/tazobactam IVPB. 3.375Gram(s) IV Intermittent every 12 hours  midodrine 10milliGRAM(s) Oral three times a day  vancomycin  IVPB 500milliGRAM(s) IV Intermittent every 72 hours  sodium chloride 0.9%. 1000milliLiter(s) IV Continuous <Continuous>    MEDICATIONS  (PRN):  ALBUTerol    90 MICROgram(s) HFA Inhaler 2Puff(s) Inhalation every 6 hours PRN Shortness of Breath and/or Wheezing      Vital Signs Last 24 Hrs  T(C): 36.7, Max: 37 (01-03 @ 05:51)  T(F): 98, Max: 98.6 (01-03 @ 05:51)  HR: 68 (60 - 84)  BP: 101/50 (73/42 - 126/68)  BP(mean): --  RR: 18 (16 - 18)  SpO2: 100% (95% - 100%)    PHYSICAL EXAM:    Constitutional: NAD, well-groomed, well-developed  Respiratory: CTAB/L  Cardiovascular: S1 and S2, RRR, no M/G/R  Gastrointestinal: BS+, soft, NT/ND  Extremities: No peripheral edema  Vascular: 2+ peripheral pulses  Skin: No rashes  [place] +    LABS:                        9.5    12.1  )-----------( 186      ( 03 Jan 2017 10:21 )             27.4     03 Jan 2017 10:21    139    |  103    |  79     ----------------------------<  80     4.0     |  24     |  5.12     Ca    8.3        03 Jan 2017 10:21    TPro  5.6    /  Alb  2.1    /  TBili  0.7    /  DBili  x      /  AST  30     /  ALT  21     /  AlkPhos  66     02 Jan 2017 07:20    PT/INR - ( 02 Jan 2017 07:20 )   PT: 15.9 sec;   INR: 1.41 ratio         PTT - ( 02 Jan 2017 07:20 )  PTT:31.6 sec        MICROBIOLOGY:     Blood Culture:  01-01 @ 20:39  Culture - Blood - --  Culture Results -   No growth to date.  Gram Stain - --  Gram Stain Blood - --  Organism - --  Organism Identification - --  Specimen Source - .Blood Blood                        01-01 @ 20:38  Culture - Urine - --  Culture Results -   Culture grew 3 or more types of organisms which indicate  collection contamination; consider recollection only if clinically  indicated.  >100,000 CFU/ml Mixed gram negative rods  >100,000 CFU/ml Gram positive organisms  Method Type - --  Organism Identification - --  Specimen Source - .Urine Catheterized        Surgical Site:      Abcess Culture:  01-01 @ 20:39  Culture Results -   No growth to date.  Gram Stain - --  Gram Stain Result - --  Organism - --  Organism Identification - --  Specimen Source - .Blood Blood          AFB:        RADIOLOGY & ADDITIONAL STUDIES:

## 2017-01-03 NOTE — DIETITIAN INITIAL EVALUATION ADULT. - PERTINENT LABORATORY DATA
01-03 Na139 mmol/L Glu 80 mg/dL K+ 4.0 mmol/L Cr  5.12 mg/dL<H> BUN 79 mg/dL<H> Phos n/a   Alb n/a   PAB n/a, GFR 10; (1/2) Alb 2.1L

## 2017-01-03 NOTE — PHYSICAL THERAPY INITIAL EVALUATION ADULT - PLANNED THERAPY INTERVENTIONS, PT EVAL
gait training/manual therapy techniques/motor coordination training/joint mobilization/postural re-education/bed mobility training/neuromuscular re-education/balance training/transfer training/strengthening/stretching

## 2017-01-03 NOTE — DIETITIAN INITIAL EVALUATION ADULT. - PHYSICAL APPEARANCE
BMI = 20.0; no edema noted; physical signs of malnutrition present: mild temporal & clavicle muscle wasting; mild orbital fat depletion/underweight

## 2017-01-03 NOTE — DIETITIAN INITIAL EVALUATION ADULT. - ENERGY NEEDS
Height (cm): 170.2 (01-01)  Weight (kg): 57.9 (01-02)  BMI (kg/m2): 20 (01-02)  IBW:  67.3 kg +/- 10%   % IBW: 86%  UBW:  stable  %UBW:  100% since early November 2016

## 2017-01-03 NOTE — OCCUPATIONAL THERAPY INITIAL EVALUATION ADULT - DIAGNOSIS, OT EVAL
Muscle weakness M68.2, decreased functional mobility, balance, coordination, ADL mange ment Muscle weakness M68.2, decreased functional mobility, balance, coordination, ADL management

## 2017-01-03 NOTE — PHYSICAL THERAPY INITIAL EVALUATION ADULT - GAIT DEVIATIONS NOTED, PT EVAL
decreased stride length/increased time in double stance/decreased step length/decreased wilbur/decreased velocity of limb motion

## 2017-01-03 NOTE — DIETITIAN INITIAL EVALUATION ADULT. - OTHER INFO
Pt seen for consult: low BMI + possible malnutrition. Pt previously admit early November; dx c severe malnutrition in context of chronic illness; approx 20% wt loss over 1 year. Pt wt now stable since last admit; pt reports daughter now lives c him & has been shopping & preparing his meals. However, appetite now decreased again x 1 week (50-75% of nut needs) 2/2 current medical condition. Denies any N/V/C/D or chewing/swallowing issues; reports BM as regular. Pt agreed to try Suplena for add nut support.

## 2017-01-03 NOTE — PROGRESS NOTE ADULT - SUBJECTIVE AND OBJECTIVE BOX
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 Now with resolution of hematuria.    MEDICATIONS  (STANDING):  ferrous    sulfate 325milliGRAM(s) Oral daily  fluticasone / salmeterol 250-50 MICROgram(s) Diskus 1Dose(s) Inhalation two times a day  pantoprazole    Tablet 40milliGRAM(s) Oral before breakfast  aspirin enteric coated 81milliGRAM(s) Oral daily  clopidogrel Tablet 75milliGRAM(s) Oral daily  atorvastatin 40milliGRAM(s) Oral at bedtime  tamsulosin 0.4milliGRAM(s) Oral at bedtime  finasteride 5milliGRAM(s) Oral daily  piperacillin/tazobactam IVPB. 3.375Gram(s) IV Intermittent every 12 hours  midodrine 10milliGRAM(s) Oral three times a day  vancomycin  IVPB 500milliGRAM(s) IV Intermittent every 72 hours  sodium chloride 0.9%. 1000milliLiter(s) IV Continuous <Continuous>    MEDICATIONS  (PRN):  ALBUTerol    90 MICROgram(s) HFA Inhaler 2Puff(s) Inhalation every 6 hours PRN Shortness of Breath and/or Wheezing      Allergies    No Known Allergies    Intolerances        REVIEW OF SYSTEMS:    General: no fever    Respiratory and Thorax: no sob    Gastrointestinal:	no change in BM    Genitourinary:	no hematuria  	      Vital Signs Last 24 Hrs  T(C): 36.7, Max: 37 (01-03 @ 05:51)  T(F): 98, Max: 98.6 (01-03 @ 05:51)  HR: 68 (60 - 84)  BP: 101/50 (73/42 - 126/68)  BP(mean): --  RR: 18 (16 - 18)  SpO2: 100% (95% - 100%)    PHYSICAL EXAM:    Gastrointestinal: soft, NT    Genitourinary:jc clear      LABS:                        9.5    12.1  )-----------( 186      ( 03 Jan 2017 10:21 )             27.4     03 Jan 2017 10:21    139    |  103    |  79     ----------------------------<  80     4.0     |  24     |  5.12     Ca    8.3        03 Jan 2017 10:21    TPro  5.6    /  Alb  2.1    /  TBili  0.7    /  DBili  x      /  AST  30     /  ALT  21     /  AlkPhos  66     02 Jan 2017 07:20    PT/INR - ( 02 Jan 2017 07:20 )   PT: 15.9 sec;   INR: 1.41 ratio         PTT - ( 02 Jan 2017 07:20 )  PTT:31.6 sec      RADIOLOGY & ADDITIONAL TESTS:

## 2017-01-03 NOTE — PHYSICAL THERAPY INITIAL EVALUATION ADULT - IMPAIRMENTS CONTRIBUTING TO GAIT DEVIATIONS, PT EVAL
impaired coordination/impaired motor control/narrow base of support/impaired balance/decreased strength/impaired postural control

## 2017-01-03 NOTE — OCCUPATIONAL THERAPY INITIAL EVALUATION ADULT - RANGE OF MOTION EXAMINATION, UPPER EXTREMITY
bilateral UE Active Assistive ROM was WNL (within normal limits)/bilateral UE Active ROM was WFL  (within functional limits)

## 2017-01-03 NOTE — OCCUPATIONAL THERAPY INITIAL EVALUATION ADULT - PERTINENT HX OF CURRENT PROBLEM, REHAB EVAL
Mr. Sharma presented to ER with urinary tract infection, sepsis, renal insufficiency with hematuria.

## 2017-01-03 NOTE — OCCUPATIONAL THERAPY INITIAL EVALUATION ADULT - ADDITIONAL COMMENTS
Pt claims he ambulated with a RW with assistance and was receiving PT services at home; Presently pt needs more assistance with ADL and functional mobility. Pt shows decreased protective responses in sitting when balance is displaced. Pt scores 5/100 on the Barthel ADL Index.

## 2017-01-03 NOTE — CHART NOTE - NSCHARTNOTEFT_GEN_A_CORE
Upon Nutritional Assessment by the Registered Dietitian your patient was determined to meet criteria / has evidence of the following diagnosis/diagnoses:          [x ]  Mild Protein Calorie Malnutrition        [ ]  Moderate Protein Calorie Malnutrition        [ ] Severe Protein Calorie Malnutrition        [ ] Unspecified Protein Calorie Malnutrition        [ ] Underweight / BMI <19        [ ] Morbid Obesity / BMI > 40      Findings as based on:  •  Comprehensive nutrition assessment and consultation  •  Calorie counts (nutrient intake analysis)  •  Food acceptance and intake status from observations by staff  •  Follow up  •  Patient education  •  Intervention secondary to interdisciplinary rounds  •   concerns      Treatment:    The following diet has been recommended: Renal diet with 60 g protein restriction + Suplena x 2/day (provides 850 kcal, 22 g protein)      PROVIDER Section:     By signing this assessment you are acknowledging and agree with the diagnosis/diagnoses assigned by the Registered Dietitian    Comments:

## 2017-01-04 DIAGNOSIS — I50.40 UNSPECIFIED COMBINED SYSTOLIC (CONGESTIVE) AND DIASTOLIC (CONGESTIVE) HEART FAILURE: ICD-10-CM

## 2017-01-04 DIAGNOSIS — E44.1 MILD PROTEIN-CALORIE MALNUTRITION: ICD-10-CM

## 2017-01-04 DIAGNOSIS — N40.0 BENIGN PROSTATIC HYPERPLASIA WITHOUT LOWER URINARY TRACT SYMPTOMS: ICD-10-CM

## 2017-01-04 DIAGNOSIS — I95.9 HYPOTENSION, UNSPECIFIED: ICD-10-CM

## 2017-01-04 LAB
ANION GAP SERPL CALC-SCNC: 10 MMOL/L — SIGNIFICANT CHANGE UP (ref 5–17)
BUN SERPL-MCNC: 73 MG/DL — HIGH (ref 7–23)
CALCIUM SERPL-MCNC: 8.5 MG/DL — SIGNIFICANT CHANGE UP (ref 8.5–10.1)
CHLORIDE SERPL-SCNC: 102 MMOL/L — SIGNIFICANT CHANGE UP (ref 96–108)
CO2 SERPL-SCNC: 26 MMOL/L — SIGNIFICANT CHANGE UP (ref 22–31)
CREAT SERPL-MCNC: 4.63 MG/DL — HIGH (ref 0.5–1.3)
GLUCOSE SERPL-MCNC: 84 MG/DL — SIGNIFICANT CHANGE UP (ref 70–99)
HCT VFR BLD CALC: 28.6 % — LOW (ref 39–50)
HGB BLD-MCNC: 10 G/DL — LOW (ref 13–17)
MAGNESIUM SERPL-MCNC: 2.2 MG/DL — SIGNIFICANT CHANGE UP (ref 1.8–2.4)
MCHC RBC-ENTMCNC: 30.4 PG — SIGNIFICANT CHANGE UP (ref 27–34)
MCHC RBC-ENTMCNC: 35 GM/DL — SIGNIFICANT CHANGE UP (ref 32–36)
MCV RBC AUTO: 87.1 FL — SIGNIFICANT CHANGE UP (ref 80–100)
PLATELET # BLD AUTO: 179 K/UL — SIGNIFICANT CHANGE UP (ref 150–400)
POTASSIUM SERPL-MCNC: 4 MMOL/L — SIGNIFICANT CHANGE UP (ref 3.5–5.3)
POTASSIUM SERPL-SCNC: 4 MMOL/L — SIGNIFICANT CHANGE UP (ref 3.5–5.3)
RBC # BLD: 3.28 M/UL — LOW (ref 4.2–5.8)
RBC # FLD: 14 % — SIGNIFICANT CHANGE UP (ref 11–15)
SODIUM SERPL-SCNC: 138 MMOL/L — SIGNIFICANT CHANGE UP (ref 135–145)
WBC # BLD: 11.7 K/UL — HIGH (ref 3.8–10.5)
WBC # FLD AUTO: 11.7 K/UL — HIGH (ref 3.8–10.5)

## 2017-01-04 PROCEDURE — 99233 SBSQ HOSP IP/OBS HIGH 50: CPT

## 2017-01-04 PROCEDURE — 99223 1ST HOSP IP/OBS HIGH 75: CPT

## 2017-01-04 PROCEDURE — 99231 SBSQ HOSP IP/OBS SF/LOW 25: CPT

## 2017-01-04 RX ORDER — CARVEDILOL PHOSPHATE 80 MG/1
3.12 CAPSULE, EXTENDED RELEASE ORAL EVERY 12 HOURS
Qty: 0 | Refills: 0 | Status: DISCONTINUED | OUTPATIENT
Start: 2017-01-04 | End: 2017-01-05

## 2017-01-04 RX ADMIN — MIDODRINE HYDROCHLORIDE 10 MILLIGRAM(S): 2.5 TABLET ORAL at 14:56

## 2017-01-04 RX ADMIN — MIDODRINE HYDROCHLORIDE 10 MILLIGRAM(S): 2.5 TABLET ORAL at 06:53

## 2017-01-04 RX ADMIN — Medication 1 TABLET(S): at 17:14

## 2017-01-04 RX ADMIN — CLOPIDOGREL BISULFATE 75 MILLIGRAM(S): 75 TABLET, FILM COATED ORAL at 11:26

## 2017-01-04 RX ADMIN — FLUTICASONE PROPIONATE AND SALMETEROL 1 DOSE(S): 50; 250 POWDER ORAL; RESPIRATORY (INHALATION) at 17:14

## 2017-01-04 RX ADMIN — Medication 81 MILLIGRAM(S): at 11:25

## 2017-01-04 RX ADMIN — MIDODRINE HYDROCHLORIDE 10 MILLIGRAM(S): 2.5 TABLET ORAL at 22:18

## 2017-01-04 RX ADMIN — FLUTICASONE PROPIONATE AND SALMETEROL 1 DOSE(S): 50; 250 POWDER ORAL; RESPIRATORY (INHALATION) at 06:53

## 2017-01-04 RX ADMIN — CARVEDILOL PHOSPHATE 3.12 MILLIGRAM(S): 80 CAPSULE, EXTENDED RELEASE ORAL at 18:28

## 2017-01-04 RX ADMIN — PIPERACILLIN AND TAZOBACTAM 25 GRAM(S): 4; .5 INJECTION, POWDER, LYOPHILIZED, FOR SOLUTION INTRAVENOUS at 08:32

## 2017-01-04 RX ADMIN — TAMSULOSIN HYDROCHLORIDE 0.4 MILLIGRAM(S): 0.4 CAPSULE ORAL at 22:18

## 2017-01-04 RX ADMIN — PANTOPRAZOLE SODIUM 40 MILLIGRAM(S): 20 TABLET, DELAYED RELEASE ORAL at 06:53

## 2017-01-04 RX ADMIN — FINASTERIDE 5 MILLIGRAM(S): 5 TABLET, FILM COATED ORAL at 11:25

## 2017-01-04 RX ADMIN — Medication 325 MILLIGRAM(S): at 11:25

## 2017-01-04 RX ADMIN — ATORVASTATIN CALCIUM 40 MILLIGRAM(S): 80 TABLET, FILM COATED ORAL at 22:18

## 2017-01-04 NOTE — PROGRESS NOTE ADULT - PROBLEM SELECTOR PLAN 6
renal Follow up
Nephrology evaluation appreciated  avoid nephrotoxic agents  dose meds as per creatinine clearance

## 2017-01-04 NOTE — CONSULT NOTE ADULT - SUBJECTIVE AND OBJECTIVE BOX
Chief Complaint:  Patient is a 89y old  Male who presents with malaise and not feeling well.      HPI:  Patient is 89 yr old Black male with PMH of  BPH, ARF, chronic Jc catheter, HTN, CAD 3 stents, CHF recent Echo 11/4/2016 EF 45-50% with grade II diastolic dysfunction  presenting with hypotension systolic 90's and generally feeling "not good" but cannot describe this specifically.  pt's jc found with  multiple clots and hematuria. Patient was recently admitted to [facility] 11/21-11/23. Patient is a poor historian and unable to elaborate more on how he is feeling. Patient is very thin with BMI 20 . Patient denies nausea, vomiting, diarrhea, constipation, abd pain, cough, cold , sob, headache, blurry vision or cp or palpitation.  While in the Er, patient received 4 liters of NS, wbc 20 lactate 1.6, worsening bun/creat 104/6.53 compared to 11/21 55/3.91 , , UA protein, TNTC rbc and wbcs, leukocyte esterase, Blood culture x 2 and Urine culture sent, IV zosyn and vancomycin given. Consults ID Dr Wallace . Dr Gilbert and Dr Sanches called.    Allergies:        Allergies:  	No Known Allergies:     Home Medications:   · 	Feosol 325 mg (65 mg elemental iron) oral tablet: 1 tab(s) orally once a day  · 	albuterol CFC free 90 mcg/inh inhalation aerosol: 2 puff(s) inhaled every 6 hours as needed for shortness of braeth or wheezing.  · 	Advair Diskus 250 mcg-50 mcg inhalation powder: 1 puff(s) inhaled 2 times a day  · 	pantoprazole 40 mg oral delayed release tablet: 1 tab(s) orally once a day (before a meal)  · 	finasteride 5 mg oral tablet: 1 tab(s) orally once a day  · 	tamsulosin 0.4 mg oral capsule: 1 cap(s) orally once a day  · 	clopidogrel 75 mg oral tablet: 1 tab(s) orally once a day  · 	carvedilol 3.125 mg oral tablet: 1 tab(s) orally 2 times a day  · 	Aspirin Enteric Coated 81 mg oral delayed release tablet: 1 tab(s) orally once a day  · 	atorvastatin 40 mg oral tablet: 1 tab(s) orally once a day (at bedtime)    Past Medical History:  BPH (benign prostatic hypertrophy)    Cardiac anomaly    CHF (congestive heart failure)    Hyperlipidemia    Hypertension.    Past Surgical History:  Chronic indwelling Jc catheter    H/O colonoscopy    Stented coronary artery  s/p stent x 3.    Family History:  No pertinent family history in first degree relatives.  Review of Systems:    Substance Use History:  · Substance Use	never used	    Alcohol Use History:  · Have you ever consumed alcohol	never	    Tobacco Usage:  · Tobacco Usage: Former smoker	  · Longest Period Tobacco-Free: years ago	    Passive Smoke Exposure:  · Passive Smoke Exposure	No	    ROS:  General:  No wt loss, fevers, chills, night sweats  Eyes:  Good vision, no reported pain  ENT:  No sore throat, pain, runny nose, dysphagia  CV:  No pain, palpitations hypo/hypertension  Resp:  No dyspnea, cough, tachypnea, wheezing  GI:  No pain, nausea, vomiting, diarrhea, constipation  :  No pain, bleeding, incontinence, nocturia  Muscle:  No pain, weakness  Breast:  No pain, abscess, mass, discharge  Neuro:  No weakness, tingling, memory problems  Psych:  No fatigue, insomnia, mood problems, depression  Endocrine:  No polyuria, polydipsia cold/heat intolerance  Heme:  No petechiae, ecchymosis, easy bruisability  Skin:  No rash, tattoos, scars, edema      Physical Exam:      Vital Signs:  Vital Signs Last 24 Hrs  T(C): 36.3, Max: 36.7 (01-03 @ 19:50)  T(F): 97.4, Max: 98 (01-03 @ 19:50)  HR: 77 (68 - 80)  BP: 120/68 (90/49 - 126/68)  RR: 18 (17 - 18)  SpO2: 98% (98% - 100%)      Tele: SR, 1st AVB, NSVT  General:  Appears stated age, well-groomed, well-nourished, no distress  HEENT:  NC/AT, patent nares w/ pink mucosa, OP clear w/o lesions, EOMI, conjunctivae clear, no thyromegaly, nodules, adenopathy, no JVD  Chest:  Full & symmetric excursion, no increased effort, breath sounds clear  Cardiovascular:  Regular rhythm, S1, S2, no murmur/rub/S3/S4, no carotid bruit, radial/pedal pulses 2+, no edema  Abdomen:  Soft, non-tender, non-distended, normoactive bowel sounds  Extremities:  No c/c/edema.  Skin:  No rash/erythema. Skin is warm/dry  Musculoskeletal:  Full ROM in all joints w/o swelling/tenderness/effusion  Neuro/Psych:  Alert.    Laboratory:                            10.0   11.7  )-----------( 179      ( 04 Jan 2017 06:18 )             28.6     04 Jan 2017 06:18    138    |  102    |  73     ----------------------------<  84     4.0     |  26     |  4.63     Ca    8.5        04 Jan 2017 06:18  Phos  2.9       03 Jan 2017 21:44  Mg     2.1       03 Jan 2017 21:44    Imaging:   echo:  1. Left ventricular ejection fraction, by visual estimation, is 45 to   50%.   2. Technically good study.   3. Mildly decreased global left ventricular systolic function.   4. 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   5. Normal left ventricular internal cavity size.   6. Spectral Doppler shows pseudonormal pattern of left ventricular   myocardial filling (Grade II diastolic dysfunction).   7. There is moderate concentric left ventricular hypertrophy.   8. Normal right ventricular size and function.   9. There is no evidence of pericardial effusion.  10. Mild mitral annular calcification.  11. Mild mitral valve regurgitation.  12. Thickening and calcification of the anterior and posterior mitral   valve leaflets.  13. Degenerative tricuspid valve.  14. Sclerotic aortic valve with decreased opening.  15. Compared with echo from 8/2014, findings are similar.  16. There is mild aortic root calcification.    Assessment: Patient is 89 yr old Black male with PMH of  BPH, ARF, chronic Jc catheter, HTN, CAD 3 stents, CHF recent Echo 11/4/2016 EF 45-50% with grade II diastolic dysfunction presenting with hypotension systolic 90's and generally feeling "not good" but cannot describe this specifically.  pt's jc found with  multiple clots and hematuria. Patient was recently admitted to [facility] 11/21-11/23. Patient is a poor historian and unable to elaborate more on how he is feeling. Patient is very thin with BMI 20 . Patient denies nausea, vomiting, diarrhea, constipation, abd pain, cough, cold , sob, headache, blurry vision or cp or palpitation. While in the Er, patient received 4 liters of NS, wbc 20 lactate 1.6, worsening bun/creat 104/6.53 compared to 11/21 55/3.91 , , UA protein, TNTC rbc and wbcs, leukocyte esterase, Blood culture x 2 and Urine culture sent, IV zosyn and vancomycin given. Consults ID Dr Wallace . Dr Gilbert and Dr Sanches called.    Plan:   Tele monitor, follow up labs, supportive care. Add coreg 3.125mg BID for NSVT and element of LV dysfunction.  Con't meds:  ferrous    sulfate 325milliGRAM(s) Oral daily  fluticasone / salmeterol 250-50 MICROgram(s) Diskus 1Dose(s) Inhalation two times a day  pantoprazole    Tablet 40milliGRAM(s) Oral before breakfast  aspirin enteric coated 81milliGRAM(s) Oral daily  clopidogrel Tablet 75milliGRAM(s) Oral daily  atorvastatin 40milliGRAM(s) Oral at bedtime  tamsulosin 0.4milliGRAM(s) Oral at bedtime  finasteride 5milliGRAM(s) Oral daily  midodrine 10milliGRAM(s) Oral three times a day  sodium chloride 0.9%. 1000milliLiter(s) IV Continuous <Continuous>  amoxicillin  500 milliGRAM(s)/clavulanate 1Tablet(s) Oral daily        Alin Zaidi MD, FACC, FASSHERLYN, FASNC, FACP  Director, Heart Failure Services  [facility]  , Department of Cardiology  [facility]
Called to see 89 Y/O male w/ hypotension. Pt states EMS was notified because he had hematuria x 1 day. While in ED pt was noted to have a systolic BP in the 90's after 4L of NS.       HPI:  Patient is 89 yr old Black male with PMH of  BPH, ARF, chronic Cj catheter, HTN, CAD 3 stents, CHF recent Echo 2016 EF 45-50% with grade II diastolic dysfunction  presenting with hypotension systolic 90's and   generally feeling "not good" but cannot describe this specifically.  pt's jc found with  multiple clots and hematuria. Patient was recently admitted to [place] -. Patient is a poor historian and unable to elaborate more on how he is feeling. Patient is very thin with BMI 20 . Patient denies nausea, vomiting, diarrhea, constipation, abd pain, cough, cold , sob, headache, blurry vision or cp or palp.  While in the Er, patient received 4 liters of NS , wbc 20 lactate 1.6, worsening bun/creat 104/6.53 compared to  55/3.91 , , UA protein, TNTC rbc and wbcs, leukocyte esterase, Blood culture x 2 and Urine culture sent, IV zosyn and vancomycin given. Consults ID Dr Wallace . Dr Gilbert and Dr Sanches called by Er Ruben Schwab who agreed to see the patient. (2017 03:18)      Allergies    No Known Allergies        MEDICATIONS  (STANDING):  sodium chloride 0.9% Bolus 1000milliLiter(s) IV Bolus once  ferrous    sulfate 325milliGRAM(s) Oral daily  fluticasone / salmeterol 250-50 MICROgram(s) Diskus 1Dose(s) Inhalation two times a day  pantoprazole    Tablet 40milliGRAM(s) Oral before breakfast  carvedilol 3.125milliGRAM(s) Oral every 12 hours  aspirin enteric coated 81milliGRAM(s) Oral daily  clopidogrel Tablet 75milliGRAM(s) Oral daily  atorvastatin 40milliGRAM(s) Oral at bedtime  tamsulosin 0.4milliGRAM(s) Oral at bedtime  finasteride 5milliGRAM(s) Oral daily  piperacillin/tazobactam IVPB. 3.375Gram(s) IV Intermittent every 12 hours  vancomycin  IVPB 1000milliGRAM(s) IV Intermittent every 24 hours  midodrine 10milliGRAM(s) Oral three times a day    MEDICATIONS  (PRN):  ALBUTerol    90 MICROgram(s) HFA Inhaler 2Puff(s) Inhalation every 6 hours PRN Shortness of Breath and/or Wheezing      Daily Height in cm: 170.18 (2017 14:59)    Daily     Drug Dosing Weight  Height (cm): 170.2 (2017 19:26)  Weight (kg): 59 (2017 19:26)  BMI (kg/m2): 20.4 (2017 19:26)  BSA (m2): 1.68 (2017 19:26)    PAST MEDICAL & SURGICAL HISTORY:  CHF (congestive heart failure)  Hyperlipidemia  Cardiac anomaly  BPH (benign prostatic hypertrophy)  Hypertension  Chronic indwelling Jc catheter  H/O colonoscopy  Stented coronary artery: s/t stent x 3      FAMILY HISTORY:  No pertinent family history in first degree relatives      SOCIAL HISTORY:    ADVANCE DIRECTIVES:    REVIEW OF SYSTEMS:    CONSTITUTIONAL: No fever, weight loss, or fatigue  EYES: No eye pain, visual disturbances, or discharge  ENMT:  No difficulty hearing, tinnitus, vertigo; No sinus or throat pain  NECK: No pain or stiffness  BREASTS: No pain, masses, or nipple discharge  RESPIRATORY: No cough, wheezing, chills or hemoptysis; No shortness of breath  CARDIOVASCULAR: No chest pain, palpitations, dizziness, or leg swelling  GASTROINTESTINAL: No abdominal or epigastric pain. No nausea, vomiting, or hematemesis; No diarrhea or constipation. No melena or hematochezia.  GENITOURINARY: No dysuria, frequency, hematuria, or incontinence  NEUROLOGICAL: No headaches, memory loss, loss of strength, numbness, or tremors        ICU Vital Signs Last 24 Hrs  T(C): 36.4, Max: 36.7 ( @ 16:31)  T(F): 97.5, Max: 98 ( @ 16:31)  HR: 78 (71 - 131)  BP: 96/52 (86/51 - 145/79)  BP(mean): --  ABP: --  ABP(mean): --  RR: 15 (15 - 20)  SpO2: 99% (91% - 99%)          I&O's Detail      PHYSICAL EXAM:    GENERAL: NAD, well-groomed, well-developed  HEAD:  Atraumatic, Normocephalic  EYES: EOMI, PERRLA, conjunctiva and sclera clear  ENMT: No tonsillar erythema, exudates, or enlargement  NECK: Supple, No JVD, Normal thyroid  NERVOUS SYSTEM:  Alert & Oriented X3  CHEST/LUNG: Clear to percussion bilaterally; No rales, rhonchi, wheezing, or rubs  HEART: Regular rate and rhythm; No murmurs, rubs, or gallops  ABDOMEN: Soft, Nontender, Nondistended; Bowel sounds present      LABS:  CBC Full  -  ( 2017 16:47 )  WBC Count : 20.0 K/uL  Hemoglobin : 11.2 g/dL  Hematocrit : 32.3 %  Platelet Count - Automated : 194 K/uL  Mean Cell Volume : 86.1 fl  Mean Cell Hemoglobin : 29.9 pg  Mean Cell Hemoglobin Concentration : 34.7 gm/dL  Auto Neutrophil # : x  Auto Lymphocyte # : x  Auto Monocyte # : x  Auto Eosinophil # : x  Auto Basophil # : x  Auto Neutrophil % : 90.0 %  Auto Lymphocyte % : 6.0 %  Auto Monocyte % : 4.0 %  Auto Eosinophil % : x  Auto Basophil % : x    2017 16:47    134    |  95     |  104    ----------------------------<  94     5.3     |  27     |  6.53     Ca    9.0        2017 16:47    TPro  7.1    /  Alb  2.7    /  TBili  0.8    /  DBili  x      /  AST  40     /  ALT  27     /  AlkPhos  80     2017 16:47    CAPILLARY BLOOD GLUCOSE      Urinalysis Basic - ( 2017 18:05 )    Color: Red / Appearance: bloody / S.015 / pH: x  Gluc: x / Ketone: Negative  / Bili: Negative / Urobili: Negative mg/dL   Blood: x / Protein: 500 mg/dL / Nitrite: Negative   Leuk Esterase: Moderate / RBC: TNTC /HPF / WBC TNTC /HPF   Sq Epi: x / Non Sq Epi: Few / Bacteria: TNTC                  CRITICAL CARE TIME SPENT: 60M
HPI:  Patient is 89 yr old Black male with PMH of  BPH, ARF, chronic Jc catheter, HTN, CAD 3 stents, CHF recent Echo 2016 EF 45-50% with grade II diastolic dysfunction  presenting with hypotension systolic 90's and   generally feeling "not good" but cannot describe this specifically.  pt's jc found with  multiple clots and hematuria. Patient was recently admitted to [place] -. Patient is a poor historian and unable to elaborate more on how he is feeling. Patient is very thin with BMI 20 . Patient denies nausea, vomiting, diarrhea, constipation, abd pain, cough, cold , sob, headache, blurry vision or cp or palp. He is not sure when the hematuria started or when his jc was last changed.  While in the Er, patient received 4 liters of NS , wbc 20 lactate 1.6, worsening bun/creat 104/6.53 compared to  55/3.91 , , UA protein, TNTC rbc and wbcs, leukocyte esterase, Blood culture x 2 and Urine culture sent, IV zosyn and vancomycin given. Consults ID Dr Wallace . Dr Gilbert and Dr Sanches called by Er Doc Zaheer who agreed to see the patient. (2017 03:18)      PAST MEDICAL & SURGICAL HISTORY:  CHF (congestive heart failure)  Hyperlipidemia  Cardiac anomaly  BPH (benign prostatic hypertrophy)  Hypertension  Chronic indwelling Jc catheter  H/O colonoscopy  Stented coronary artery: s/t stent x 3      REVIEW OF SYSTEMS:    General:weakness	    Respiratory and Thorax:no sob @ present  	  Cardiovascular:	no CP    Gastrointestinal:	No change in BM    Genitourinary:	hematuria    Musculoskeletal: no bone pain	      MEDICATIONS  (STANDING):  ferrous    sulfate 325milliGRAM(s) Oral daily  fluticasone / salmeterol 250-50 MICROgram(s) Diskus 1Dose(s) Inhalation two times a day  pantoprazole    Tablet 40milliGRAM(s) Oral before breakfast  aspirin enteric coated 81milliGRAM(s) Oral daily  clopidogrel Tablet 75milliGRAM(s) Oral daily  atorvastatin 40milliGRAM(s) Oral at bedtime  tamsulosin 0.4milliGRAM(s) Oral at bedtime  finasteride 5milliGRAM(s) Oral daily  piperacillin/tazobactam IVPB. 3.375Gram(s) IV Intermittent every 12 hours  midodrine 10milliGRAM(s) Oral three times a day  vancomycin  IVPB 500milliGRAM(s) IV Intermittent every 72 hours  sodium chloride 0.9%. 1000milliLiter(s) IV Continuous <Continuous>    MEDICATIONS  (PRN):  ALBUTerol    90 MICROgram(s) HFA Inhaler 2Puff(s) Inhalation every 6 hours PRN Shortness of Breath and/or Wheezing      Allergies    No Known Allergies    Intolerances        SOCIAL HISTORY:    FAMILY HISTORY:  No pertinent family history in first degree relatives      Vital Signs Last 24 Hrs  T(C): 36.4, Max: 36.9 ( @ 07:35)  T(F): 97.6, Max: 98.4 ( @ 07:35)  HR: 79 (71 - 131)  BP: 91/51 (86/51 - 145/79)  BP(mean): --  RR: 16 (15 - 20)  SpO2: 98% (91% - 99%)      PHYSICAL EXAM:    Gastrointestinal: soft, ND,NT    Genitourinary: 14 Citizen of [place] jc; phimosis, foreskin ulceration; jc light tea colored       LABS:                        9.5    17.4  )-----------( 152      ( 2017 07:20 )             25.9     2017 07:20    138    |  103    |  90     ----------------------------<  90     4.3     |  23     |  5.40     Ca    8.1        2017 07:20    TPro  5.6    /  Alb  2.1    /  TBili  0.7    /  DBili  x      /  AST  30     /  ALT  21     /  AlkPhos  66     2017 07:20    PT/INR - ( 2017 07:20 )   PT: 15.9 sec;   INR: 1.41 ratio         PTT - ( 2017 07:20 )  PTT:31.6 sec  Urinalysis Basic - ( 2017 18:05 )    Color: Red / Appearance: bloody / S.015 / pH: x  Gluc: x / Ketone: Negative  / Bili: Negative / Urobili: Negative mg/dL   Blood: x / Protein: 500 mg/dL / Nitrite: Negative   Leuk Esterase: Moderate / RBC: TNTC /HPF / WBC TNTC /HPF   Sq Epi: x / Non Sq Epi: Few / Bacteria: TNTC        RADIOLOGY & ADDITIONAL STUDIES:
Infectious Diseases - Attending at Dr. Lopez    HPI:  Patient is 89 yr old Black male with PMH of  BPH, ARF, chronic Jc catheter, HTN, CAD 3 stents, CHF recent Echo 2016 EF 45-50% with grade II diastolic dysfunction  presenting with hypotension systolic 90's and   generally feeling "not good" but cannot describe this specifically. AS per Family on bedside, per them he has jc since last three months and its changed by urologist Q monthly. it was due to be changed soon when her was brought in with above complains. Pt's jc was  found with  multiple clots and hematuria and it was changed by Dr Sanches here . Patient was recently admitted to [place] -. Patient is a poor historian and unable to elaborate more on how he is feeling. Patient is very thin with BMI 20 . Patient denies nausea, vomiting, diarrhea, constipation, abd pain, cough, cold , sob, headache, blurry vision or cp or palp.  While in the Er, patient received 4 liters of NS , wbc 20 lactate 1.6, worsening bun/creat 104/6.53 compared to  55/3.91 , , UA protein, TNTC rbc and wbcs, leukocyte esterase, Blood culture x 2 and Urine culture sent, IV zosyn and vancomycin given.     PAST MEDICAL & SURGICAL HISTORY:  CHF (congestive heart failure)  Hyperlipidemia  Cardiac anomaly  BPH (benign prostatic hypertrophy)  Hypertension  Chronic indwelling Jc catheter  H/O colonoscopy  Stented coronary artery: s/t stent x 3      Allergies    No Known Allergies    Intolerances        FAMILY HISTORY:  No pertinent family history in first degree relatives      SOCIAL HISTORY: No smoking ,alcohol or substance abuse    REVIEW OF SYSTEMS:    Constitutional: No fever, weight loss or fatigue  Respiratory: No cough, wheezing, chills or hemoptysis  Cardiovascular: No chest pain, palpitations, shortness of breath, dizziness or leg swelling  Gastrointestinal: No abdominal or epigastric pain. No nausea, vomiting or hematemesis; No diarrhea or constipation. No melena or hematochezia.  Genitourinary: Jc catheter  Rectal: No pain, hemorrhoids or incontinence  Neurological: No headaches, memory loss, loss of strength, numbness or tremors  Skin: No itching, burning, rashes or lesions   Lymph Nodes: No enlarged glands  Endocrine: No heat or cold intolerance; No hair loss  Musculoskeletal: No joint pain or swelling; No muscle, back or extremity pain  Heme/Lymph: No easy bruising or bleeding gums  Allergy and Immunologic: No hives or eczema    MEDICATIONS  (STANDING):  ferrous    sulfate 325milliGRAM(s) Oral daily  fluticasone / salmeterol 250-50 MICROgram(s) Diskus 1Dose(s) Inhalation two times a day  pantoprazole    Tablet 40milliGRAM(s) Oral before breakfast  aspirin enteric coated 81milliGRAM(s) Oral daily  clopidogrel Tablet 75milliGRAM(s) Oral daily  atorvastatin 40milliGRAM(s) Oral at bedtime  tamsulosin 0.4milliGRAM(s) Oral at bedtime  finasteride 5milliGRAM(s) Oral daily  piperacillin/tazobactam IVPB. 3.375Gram(s) IV Intermittent every 12 hours  midodrine 10milliGRAM(s) Oral three times a day  vancomycin  IVPB 500milliGRAM(s) IV Intermittent every 72 hours  sodium chloride 0.9%. 1000milliLiter(s) IV Continuous <Continuous>    MEDICATIONS  (PRN):  ALBUTerol    90 MICROgram(s) HFA Inhaler 2Puff(s) Inhalation every 6 hours PRN Shortness of Breath and/or Wheezing      Vital Signs Last 24 Hrs  T(C): 36.4, Max: 36.9 ( @ 07:35)  T(F): 97.6, Max: 98.4 ( @ 07:35)  HR: 79 (71 - 131)  BP: 91/51 (86/51 - 145/79)  BP(mean): --  RR: 16 (15 - 20)  SpO2: 98% (91% - 99%)    PHYSICAL EXAM:    Constitutional: NAD, well-groomed, well-developed  HEENT: PERRLA, EOMI, Normal Hearing, MMM  Neck: No LAD, No JVD  Back: Normal spine flexure, No CVA tenderness  Respiratory: CTAP Cardiovascular: S1 and S2, RRR, no M/G/R  Gastrointestinal: BS+, soft, NT/ND  Extremities: No peripheral edema  Vascular: 2+ peripheral pulses  Neurological: A/O x 3, no focal deficits  Psychiatric: Normal mood, normal affect  Musculoskeletal: 5/5 strength b/l upper and lower extremities  Skin: No rashes      LABS:                        9.5    17.4  )-----------( 152      ( 2017 07:20 )             25.9     2017 07:20    138    |  103    |  90     ----------------------------<  90     4.3     |  23     |  5.40     Ca    8.1        2017 07:20    TPro  5.6    /  Alb  2.1    /  TBili  0.7    /  DBili  x      /  AST  30     /  ALT  21     /  AlkPhos  66     2017 07:20    PT/INR - ( 2017 07:20 )   PT: 15.9 sec;   INR: 1.41 ratio         PTT - ( 2017 07:20 )  PTT:31.6 sec  Urinalysis Basic - ( 2017 18:05 )    Color: Red / Appearance: bloody / S.015 / pH: x  Gluc: x / Ketone: Negative  / Bili: Negative / Urobili: Negative mg/dL   Blood: x / Protein: 500 mg/dL / Nitrite: Negative   Leuk Esterase: Moderate / RBC: TNTC /HPF / WBC TNTC /HPF   Sq Epi: x / Non Sq Epi: Few / Bacteria: TNTC        MICROBIOLOGY:      RADIOLOGY & ADDITIONAL STUDIES:
Patient is a 89y old  Male who presents with a chief complaint of Patient 89 yr old black male complaining of malaise and not feeling good (2017 03:18)    HPI:  Patient is 89 yr old Black male with PMH of  BPH, ARF, chronic Jc catheter, HTN, CAD 3 stents, CHF recent Echo 2016 EF 45-50% with grade II diastolic dysfunction  presenting with hypotension systolic 90's and   generally feeling "not good" but cannot describe this specifically.  pt's jc found with  multiple clots and hematuria. Patient was recently admitted to [place] -. Patient is a poor historian and unable to elaborate more on how he is feeling. Patient is very thin with BMI 20 . Patient denies nausea, vomiting, diarrhea, constipation, abd pain, cough, cold , sob, headache, blurry vision or cp or palp.  While in the Er, patient received 4 liters of NS , wbc 20 lactate 1.6, worsening bun/creat 104/6.53 compared to  55/3.91 , , UA protein, TNTC rbc and wbcs, leukocyte esterase, Blood culture x 2 and Urine culture sent, IV zosyn and vancomycin given. Consults ID Dr Wallace . Dr Gilbert and Dr Sanches called by Er Doc Zaheer who agreed to see the patient. (2017 03:18)    PAST MEDICAL & SURGICAL HISTORY:  CHF (congestive heart failure)  Hyperlipidemia  Cardiac anomaly  BPH (benign prostatic hypertrophy)  Hypertension  Chronic indwelling Jc catheter  H/O colonoscopy  Stented coronary artery: s/t stent x 3    FAMILY HISTORY:  No pertinent family history in first degree relatives    No Known Allergies    MEDICATIONS  (STANDING):  ferrous    sulfate 325milliGRAM(s) Oral daily  fluticasone / salmeterol 250-50 MICROgram(s) Diskus 1Dose(s) Inhalation two times a day  pantoprazole    Tablet 40milliGRAM(s) Oral before breakfast  aspirin enteric coated 81milliGRAM(s) Oral daily  clopidogrel Tablet 75milliGRAM(s) Oral daily  atorvastatin 40milliGRAM(s) Oral at bedtime  tamsulosin 0.4milliGRAM(s) Oral at bedtime  finasteride 5milliGRAM(s) Oral daily  piperacillin/tazobactam IVPB. 3.375Gram(s) IV Intermittent every 12 hours  midodrine 10milliGRAM(s) Oral three times a day  vancomycin  IVPB 500milliGRAM(s) IV Intermittent every 72 hours  sodium chloride 0.9%. 1000milliLiter(s) IV Continuous <Continuous>    MEDICATIONS  (PRN):  ALBUTerol    90 MICROgram(s) HFA Inhaler 2Puff(s) Inhalation every 6 hours PRN Shortness of Breath and/or Wheezing    Vital Signs Last 24 Hrs  T(C): 36.4, Max: 36.9 ( @ 07:35)  T(F): 97.6, Max: 98.4 ( @ 07:35)  HR: 79 (71 - 131)  BP: 91/51 (86/51 - 145/79)  BP(mean): --  RR: 16 (15 - 20)  SpO2: 98% (91% - 99%)    CAPILLARY BLOOD GLUCOSE    PHYSICAL EXAM: awake alert x2      T(C): 36.4, Max: 36.9 ( @ 07:35)  HR: 79 (71 - 131)  BP: 91/51 (86/51 - 145/79)  RR: 16 (15 - 20)  SpO2: 98% (91% - 99%)  Wt(kg): --  Respiratory: clear anteriorly, decreased BS at bases  Cardiovascular: S1 S2  Gastrointestinal: soft NT ND +BS  Extremities:  1 + edema              2017 07:20    138    |  103    |  90     ----------------------------<  90     4.3     |  23     |  5.40     Ca    8.1        2017 07:20    TPro  5.6    /  Alb  2.1    /  TBili  0.7    /  DBili  x      /  AST  30     /  ALT  21     /  AlkPhos  66     2017 07:20                          9.5    17.4  )-----------( 152      ( 2017 07:20 )             25.9     Urinalysis Basic - ( 2017 18:05 )    Color: Red / Appearance: bloody / S.015 / pH: x  Gluc: x / Ketone: Negative  / Bili: Negative / Urobili: Negative mg/dL   Blood: x / Protein: 500 mg/dL / Nitrite: Negative   Leuk Esterase: Moderate / RBC: TNTC /HPF / WBC TNTC /HPF   Sq Epi: x / Non Sq Epi: Few / Bacteria: TNTC            Assessment and Plan    ROSAURA, CKD (4); pre renal azotemia, sepsis, ischemic ATN;  IVF; IV abx;   Jc exchange; Urine eosinophils.  Supportive care will follow.

## 2017-01-04 NOTE — PROGRESS NOTE ADULT - ASSESSMENT
89 yr old Black male with PMH of  BPH, ROSAURA on Stage 4 CKD, , chronic Salcido catheter, HTN, CAD x 3 stents, CHF recent Echo 11/4/2016 EF 45-50% with grade II diastolic dysfunction  presenting with hypotension, malaise and hematuria , found to be in sepsis on arrival to ED, also with up trending creatinine, ROSAURA likely pre renal and ATN in setting of sepsis sec. to acute cystitis with hematuria. patient started on IV Abx, urine clear now, WBC trending down, on IV zosyn, renal functions show some improvement, as per RN on tele run of 11 beats of V- tach last evening.  patient was not started on SC heparin for DVT prophylaxis given active hematuria on admission

## 2017-01-04 NOTE — PROGRESS NOTE ADULT - PROBLEM SELECTOR PLAN 3
Continue abx. Discussed with patient and family @ bedside
Continue antibiotics; wbc and temperature improved.
dietary supplemets
add karly   dietician josr morales
on indwelling catheter

## 2017-01-04 NOTE — PROGRESS NOTE ADULT - PROBLEM SELECTOR PLAN 4
continue current meds
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

## 2017-01-04 NOTE — PROGRESS NOTE ADULT - SUBJECTIVE AND OBJECTIVE BOX
Patient seen in follow up for Becka, CKD ; feels well. no distress.    MEDICATIONS  (STANDING):  ferrous    sulfate 325milliGRAM(s) Oral daily  fluticasone / salmeterol 250-50 MICROgram(s) Diskus 1Dose(s) Inhalation two times a day  pantoprazole    Tablet 40milliGRAM(s) Oral before breakfast  aspirin enteric coated 81milliGRAM(s) Oral daily  clopidogrel Tablet 75milliGRAM(s) Oral daily  atorvastatin 40milliGRAM(s) Oral at bedtime  tamsulosin 0.4milliGRAM(s) Oral at bedtime  finasteride 5milliGRAM(s) Oral daily  piperacillin/tazobactam IVPB. 3.375Gram(s) IV Intermittent every 12 hours  midodrine 10milliGRAM(s) Oral three times a day  vancomycin  IVPB 500milliGRAM(s) IV Intermittent every 72 hours  sodium chloride 0.9%. 1000milliLiter(s) IV Continuous <Continuous>    MEDICATIONS  (PRN):  ALBUTerol    90 MICROgram(s) HFA Inhaler 2Puff(s) Inhalation every 6 hours PRN Shortness of Breath and/or Wheezing    PHYSICAL EXAM:      T(C): 36.6, Max: 36.7 (01-03 @ 19:50)  HR: 76 (61 - 76)  BP: 106/44 (88/55 - 126/68)  RR: 18 (17 - 18)  SpO2: 98% (96% - 100%)  Wt(kg): --  Respiratory: clear anteriorly, decreased BS at bases  Cardiovascular: S1 S2  Gastrointestinal: soft NT ND +BS  Extremities:  tr  edema                                    10.0   11.7  )-----------( 179      ( 04 Jan 2017 06:18 )             28.6     04 Jan 2017 06:18    138    |  102    |  73     ----------------------------<  84     4.0     |  26     |  4.63     Ca    8.5        04 Jan 2017 06:18  Phos  2.9       03 Jan 2017 21:44  Mg     2.1       03 Jan 2017 21:44            Assessment and Plan:  BECKA, CKD 4; stable.  Continue current rx.  D/C planning.

## 2017-01-04 NOTE — PROGRESS NOTE ADULT - SUBJECTIVE AND OBJECTIVE BOX
INTERVAL HPI/OVERNIGHT EVENTS: No bleeding in jc    MEDICATIONS  (STANDING):  ferrous    sulfate 325milliGRAM(s) Oral daily  fluticasone / salmeterol 250-50 MICROgram(s) Diskus 1Dose(s) Inhalation two times a day  pantoprazole    Tablet 40milliGRAM(s) Oral before breakfast  aspirin enteric coated 81milliGRAM(s) Oral daily  clopidogrel Tablet 75milliGRAM(s) Oral daily  atorvastatin 40milliGRAM(s) Oral at bedtime  tamsulosin 0.4milliGRAM(s) Oral at bedtime  finasteride 5milliGRAM(s) Oral daily  piperacillin/tazobactam IVPB. 3.375Gram(s) IV Intermittent every 12 hours  midodrine 10milliGRAM(s) Oral three times a day  vancomycin  IVPB 500milliGRAM(s) IV Intermittent every 72 hours  sodium chloride 0.9%. 1000milliLiter(s) IV Continuous <Continuous>    MEDICATIONS  (PRN):  ALBUTerol    90 MICROgram(s) HFA Inhaler 2Puff(s) Inhalation every 6 hours PRN Shortness of Breath and/or Wheezing      Allergies    No Known Allergies    Intolerances        REVIEW OF SYSTEMS:    General: feeling wee	    Respiratory and Thorax:no sob  	  Cardiovascular:	no cp    Gastrointestinal:	no pain    Genitourinary:no hematuria    Musculoskeletal:	      Vital Signs Last 24 Hrs  T(C): 36.3, Max: 36.7 (01-03 @ 19:50)  T(F): 97.4, Max: 98 (01-03 @ 19:50)  HR: 80 (67 - 80)  BP: 107/50 (90/49 - 126/68)  BP(mean): --  RR: 17 (17 - 18)  SpO2: 98% (98% - 100%)    PHYSICAL EXAM::    Gastrointestinal: soft, ND; NT    Genitourinary: jc clear      LABS:                        10.0   11.7  )-----------( 179      ( 04 Jan 2017 06:18 )             28.6     04 Jan 2017 06:18    138    |  102    |  73     ----------------------------<  84     4.0     |  26     |  4.63     Ca    8.5        04 Jan 2017 06:18  Phos  2.9       03 Jan 2017 21:44  Mg     2.1       03 Jan 2017 21:44            RADIOLOGY & ADDITIONAL TESTS:

## 2017-01-04 NOTE — PROGRESS NOTE ADULT - PROBLEM SELECTOR PROBLEM 4
Renal insufficiency
Essential hypertension
Benign prostatic hyperplasia, presence of lower urinary tract symptoms unspecified, unspecified morphology

## 2017-01-04 NOTE — PROGRESS NOTE ADULT - PROBLEM SELECTOR PROBLEM 5
ROSAURA (acute kidney injury)
Combined systolic and diastolic congestive heart failure, unspecified congestive heart failure chronicity

## 2017-01-04 NOTE — PROGRESS NOTE ADULT - SUBJECTIVE AND OBJECTIVE BOX
CHIEF COMPLAINT/INTERVAL HISTORY:     Patient 89 yr old black male complaining of malaise and not feeling good (2017 03:18)      HPI:  Patient is 89 yr old Black male with PMH of  BPH, ARF, chronic Jc catheter, HTN, CAD x 3 stents, CHF recent Echo 2016 EF 45-50% with grade II diastolic dysfunction  presenting with hypotension systolic 90's and   generally feeling "not good" but cannot describe this specifically.  pt's jc found with  multiple clots and hematuria. Patient was recently admitted to [facility] -. Patient is a poor historian and unable to elaborate more on how he is feeling. Patient is very thin with BMI 20 . Patient denies nausea, vomiting, diarrhea, constipation, abd pain, cough, cold , sob, headache, blurry vision or cp or palp.  While in the Er, patient received 4 liters of NS , wbc 20 lactate 1.6, worsening bun/creat 104/6.53 compared to  55/3.91 , , UA protein, TNTC rbc and wbcs, leukocyte esterase, Blood culture x 2 and Urine culture sent, IV zosyn and vancomycin given. Consults ID Dr Wallace . Dr Gilbert and Dr Sanches called by Er Doc Zaheer who agreed to see the patient. (2017 03:18)  FC changed in ed.    Overnight issues  patient feeling better, denies abd, pain , nausea, vomiting, CP or SOB  On tele monitor 11 beats of V tach last evening  urine in FC clearing up, hematuria resolved.    SUBJECTIVE & OBJECTIVE: Pt seen and examined at bedside.  patient feeling better, denies abd, pain , nausea, vomiting, CP or SOB  Tolerating PO     Vital Signs Last 24 Hrs  T(C): 36.6, Max: 37 ( @ 05:51)  T(F): 97.8, Max: 98.6 ( @ 05:51)  HR: 61 (60 - 115)  BP: 88/55 (73/42 - 103/55)  BP(mean): --  ABP: --  ABP(mean): --  RR: 17 (15 - 18)  SpO2: 96% (95% - 100%)        MEDICATIONS  (STANDING):  ferrous    sulfate 325milliGRAM(s) Oral daily  fluticasone / salmeterol 250-50 MICROgram(s) Diskus 1Dose(s) Inhalation two times a day  pantoprazole    Tablet 40milliGRAM(s) Oral before breakfast  aspirin enteric coated 81milliGRAM(s) Oral daily  clopidogrel Tablet 75milliGRAM(s) Oral daily  atorvastatin 40milliGRAM(s) Oral at bedtime  tamsulosin 0.4milliGRAM(s) Oral at bedtime  finasteride 5milliGRAM(s) Oral daily  piperacillin/tazobactam IVPB. 3.375Gram(s) IV Intermittent every 12 hours  midodrine 10milliGRAM(s) Oral three times a day  vancomycin  IVPB 500milliGRAM(s) IV Intermittent every 72 hours  sodium chloride 0.9%. 1000milliLiter(s) IV Continuous <Continuous>    MEDICATIONS  (PRN):  ALBUTerol    90 MICROgram(s) HFA Inhaler 2Puff(s) Inhalation every 6 hours PRN Shortness of Breath and/or Wheezing        PHYSICAL EXAM:    GENERAL: NAD, AAOx2  HEAD:  Atraumatic, Normocephalic  EYES: EOMI, PERRLA, mild pallor  ENMT: Moist mucous membranes  NECK: Supple, No JVD  NERVOUS SYSTEM:  AAOx2, no focal neuro deficits  CHEST/LUNG: Clear to auscultation bilaterally; No rales, rhonchi, wheezing, or rubs  HEART: Regular rate and rhythm;  ABDOMEN: Soft, Nontender, Nondistended; Bowel sounds present, FC draining clear urine  EXTREMITIES:  no pedal edema    LABS:    2017 06:18    138    |  102    |  73     ----------------------------<  84     4.0     |  26     |  4.63     Ca    8.5        2017 06:18  Phos  2.9       2017 21:44  Mg     2.1       2017 21:44                          10.0   11.7  )-----------( 179      ( 2017 06:18 )             28.6               PTT - ( 2017 07:20 )  PTT:31.6 sec  Urinalysis Basic - ( 2017 18:05 )    Color: Red / Appearance: bloody / S.015 / pH: x  Gluc: x / Ketone: Negative  / Bili: Negative / Urobili: Negative mg/dL   Blood: x / Protein: 500 mg/dL / Nitrite: Negative   Leuk Esterase: Moderate / RBC: TNTC /HPF / WBC TNTC /HPF   Sq Epi: x / Non Sq Epi: Few / Bacteria: TNTC      RADIOLOGY & ADDITIONAL TESTS:  Imaging Personally Reviewed:  [X ] YES      Consultant(s) Notes Reviewed:  [X ] YES

## 2017-01-04 NOTE — PROGRESS NOTE ADULT - PROBLEM SELECTOR PLAN 5
improving with hydration
c/w home medications  had an episode of 11 beats of V- tach last evening, will check Mg level and discuss with cardiology.

## 2017-01-04 NOTE — PROGRESS NOTE ADULT - SUBJECTIVE AND OBJECTIVE BOX
Patient is a 89y old  Male who presents with a chief complaint of Patient 89 yr old black male complaining of malaise and not feeling good (02 Jan 2017 03:18)      INTERVAL HPI / OVERNIGHT EVENTS:Feeling better, no fever, no abdominal pain    MEDICATIONS  (STANDING):  ferrous    sulfate 325milliGRAM(s) Oral daily  fluticasone / salmeterol 250-50 MICROgram(s) Diskus 1Dose(s) Inhalation two times a day  pantoprazole    Tablet 40milliGRAM(s) Oral before breakfast  aspirin enteric coated 81milliGRAM(s) Oral daily  clopidogrel Tablet 75milliGRAM(s) Oral daily  atorvastatin 40milliGRAM(s) Oral at bedtime  tamsulosin 0.4milliGRAM(s) Oral at bedtime  finasteride 5milliGRAM(s) Oral daily  piperacillin/tazobactam IVPB. 3.375Gram(s) IV Intermittent every 12 hours  midodrine 10milliGRAM(s) Oral three times a day  vancomycin  IVPB 500milliGRAM(s) IV Intermittent every 72 hours  sodium chloride 0.9%. 1000milliLiter(s) IV Continuous <Continuous>    MEDICATIONS  (PRN):  ALBUTerol    90 MICROgram(s) HFA Inhaler 2Puff(s) Inhalation every 6 hours PRN Shortness of Breath and/or Wheezing      Vital Signs Last 24 Hrs  T(C): 36.7, Max: 37 (01-03 @ 05:51)  T(F): 98, Max: 98.6 (01-03 @ 05:51)  HR: 68 (60 - 84)  BP: 101/50 (73/42 - 126/68)  BP(mean): --  RR: 18 (16 - 18)  SpO2: 100% (95% - 100%)    PHYSICAL EXAM:    Constitutional: NAD, well-groomed, well-developed  Respiratory: CTAB/L  Cardiovascular: S1 and S2, RRR, no M/G/R  Gastrointestinal: BS+, soft, NT/ND  Extremities: No peripheral edema  Vascular: 2+ peripheral pulses  Skin: No rashes  Salcido + clear urine    LABS:             WBC 12.1--> 11.7      MICROBIOLOGY:     Blood Culture:  01-01 @ 20:39  Culture - Blood - --  Culture Results -   No growth to date.  Gram Stain - --  Gram Stain Blood - --  Organism - --  Organism Identification - --  Specimen Source - .Blood Blood                        01-01 @ 20:38  Culture - Urine - --  Culture Results -   Culture grew 3 or more types of organisms which indicate  collection contamination; consider recollection only if clinically  indicated.  >100,000 CFU/ml Mixed gram negative rods  >100,000 CFU/ml Gram positive organisms  Method Type - --  Organism Identification - --  Specimen Source - .Urine Catheterized        Surgical Site:      Abcess Culture:  01-01 @ 20:39  Culture Results -   No growth to date.  Gram Stain - --  Gram Stain Result - --  Organism - --  Organism Identification - --  Specimen Source - .Blood Blood          AFB:        RADIOLOGY & ADDITIONAL STUDIES:

## 2017-01-05 VITALS — SYSTOLIC BLOOD PRESSURE: 115 MMHG | DIASTOLIC BLOOD PRESSURE: 57 MMHG | HEART RATE: 87 BPM

## 2017-01-05 PROCEDURE — 99239 HOSP IP/OBS DSCHRG MGMT >30: CPT

## 2017-01-05 PROCEDURE — 99231 SBSQ HOSP IP/OBS SF/LOW 25: CPT

## 2017-01-05 RX ORDER — MIDODRINE HYDROCHLORIDE 2.5 MG/1
1 TABLET ORAL
Qty: 90 | Refills: 0 | OUTPATIENT
Start: 2017-01-05 | End: 2017-02-04

## 2017-01-05 RX ORDER — CARVEDILOL PHOSPHATE 80 MG/1
1 CAPSULE, EXTENDED RELEASE ORAL
Qty: 60 | Refills: 0 | OUTPATIENT
Start: 2017-01-05 | End: 2017-02-04

## 2017-01-05 RX ADMIN — MIDODRINE HYDROCHLORIDE 10 MILLIGRAM(S): 2.5 TABLET ORAL at 05:50

## 2017-01-05 RX ADMIN — PANTOPRAZOLE SODIUM 40 MILLIGRAM(S): 20 TABLET, DELAYED RELEASE ORAL at 10:15

## 2017-01-05 RX ADMIN — CARVEDILOL PHOSPHATE 3.12 MILLIGRAM(S): 80 CAPSULE, EXTENDED RELEASE ORAL at 05:50

## 2017-01-05 RX ADMIN — Medication 325 MILLIGRAM(S): at 11:42

## 2017-01-05 RX ADMIN — Medication 81 MILLIGRAM(S): at 11:42

## 2017-01-05 RX ADMIN — MIDODRINE HYDROCHLORIDE 10 MILLIGRAM(S): 2.5 TABLET ORAL at 13:04

## 2017-01-05 RX ADMIN — CLOPIDOGREL BISULFATE 75 MILLIGRAM(S): 75 TABLET, FILM COATED ORAL at 11:42

## 2017-01-05 RX ADMIN — FINASTERIDE 5 MILLIGRAM(S): 5 TABLET, FILM COATED ORAL at 11:42

## 2017-01-05 RX ADMIN — FLUTICASONE PROPIONATE AND SALMETEROL 1 DOSE(S): 50; 250 POWDER ORAL; RESPIRATORY (INHALATION) at 05:50

## 2017-01-05 RX ADMIN — Medication 1 TABLET(S): at 11:42

## 2017-01-05 NOTE — DISCHARGE NOTE ADULT - CARE PROVIDERS DIRECT ADDRESSES
,[email or web address],[email or web address],DirectAddress_Unknown

## 2017-01-05 NOTE — PROGRESS NOTE ADULT - PROBLEM SELECTOR PROBLEM 1
Acute cystitis with hematuria
Acute cystitis with hematuria
Sepsis, due to unspecified organism
Gross hematuria
Gross hematuria
Sepsis, due to unspecified organism
Sepsis, due to unspecified organism

## 2017-01-05 NOTE — DISCHARGE NOTE ADULT - CONDITION (STATED IN TERMS THAT PERMIT A SPECIFIC MEASURABLE COMPARISON WITH CONDITION ON ADMISSION):
Patient is stable: tolerating diet, FC draining clear urine,  Denies any complaints  Vital Signs Last 24 Hrs  T(C): 36.6, Max: 37.7 (01-05 @ 01:48)  T(F): 97.8, Max: 99.8 (01-05 @ 01:48)  HR: 96 (76 - 105)  BP: 96/50 (90/40 - 120/68)  BP(mean): --  RR: 16 (16 - 18)  SpO2: 99% (97% - 99%)  P/E:  NAD, AAOx2  Heent: perrla, eomi, 1+ pallor  CVS; s1, s2, reg  Lungs: CTA b/l  Abd: soft, n/t, bs present  Extremities, no edema  Neuro: non focal

## 2017-01-05 NOTE — DISCHARGE NOTE ADULT - CARE PLAN
Principal Discharge DX:	Acute cystitis with hematuria  Goal:	complete resolution of symptoms  Instructions for follow-up, activity and diet:	Complete oral antibiotics course as directed.  Secondary Diagnosis:	Chronic indwelling Jc catheter  Instructions for follow-up, activity and diet:	secondary to BPH/urinary retention due to atonic bladder and BPH.  Get your jc catheter changed regularly by your urologist as advised. Follow with Urology 1 to 2 weeks (your jc catheter was changed in ER at the time of this admission)  Secondary Diagnosis:	Combined systolic and diastolic congestive heart failure, unspecified congestive heart failure chronicity  Instructions for follow-up, activity and diet:	Continue home medications. Keep follow up with cardiology/heart doctor x within 1 week.  Secondary Diagnosis:	Hypotension, unspecified hypotension type  Instructions for follow-up, activity and diet:	Midodrine is started for hypotension, follow with your PCP for regular BP monitoring and any medication dose adjustment  Secondary Diagnosis:	Mild protein-calorie malnutrition  Instructions for follow-up, activity and diet:	Take suplena 1 Can x 2 times a day  Secondary Diagnosis:	ATN (acute tubular necrosis)  Instructions for follow-up, activity and diet:	Ischemic ATN/ROSAURA due to pre renal azotemia in setting of sepsis and UTI on CKD stage 4, avoid nephrotoxic agents (for example motrin, advil ), Follow up with your kidney doctor within one week  Secondary Diagnosis:	Sepsis, due to unspecified organism  Instructions for follow-up, activity and diet:	sepsis secondary to UTI- you received IV antibiotics during hospital stay, now take oral antibiotics for 6 more days and finish course

## 2017-01-05 NOTE — DISCHARGE NOTE ADULT - MEDICATION SUMMARY - MEDICATIONS TO TAKE
I will START or STAY ON the medications listed below when I get home from the hospital:    Bed side commode  -- Dx: CHF I50.1, CAD I25.1, HTN I10  -- Indication: For CHF (congestive heart failure)    Shower chair  -- Dx: CHF I50.1, CAD I25.1, HTN I10  -- Indication: For CHF (congestive heart failure)    finasteride 5 mg oral tablet  -- 1 tab(s) by mouth once a day  -- Indication: For BPH (benign prostatic hypertrophy)    Aspirin Enteric Coated 81 mg oral delayed release tablet  -- 1 tab(s) by mouth once a day  -- Indication: For CAD    tamsulosin 0.4 mg oral capsule  -- 1 cap(s) by mouth once a day  -- Indication: For BPH (benign prostatic hypertrophy)    atorvastatin 40 mg oral tablet  -- 1 tab(s) by mouth once a day (at bedtime)  -- Indication: For Hyperlipidemia    clopidogrel 75 mg oral tablet  -- 1 tab(s) by mouth once a day  -- Indication: For CAD    carvedilol 3.125 mg oral tablet  -- 1 tab(s) by mouth every 12 hours, hold if Systolic BP less than 90  -- Indication: For CHF (congestive heart failure)    Advair Diskus 250 mcg-50 mcg inhalation powder  -- 1 puff(s) inhaled 2 times a day  -- Check with your doctor before becoming pregnant.  For inhalation only.  Obtain medical advice before taking any non-prescription drugs as some may affect the action of this medication.  Rinse mouth thoroughly after use.    -- Indication: For Shortness of breath    albuterol CFC free 90 mcg/inh inhalation aerosol  -- 2 puff(s) inhaled every 6 hours as needed for shortness of braeth or wheezing.  -- For inhalation only.  It is very important that you take or use this exactly as directed.  Do not skip doses or discontinue unless directed by your doctor.  Obtain medical advice before taking any non-prescription drugs as some may affect the action of this medication.  Shake well before use.    -- Indication: For Shortness of breath    Feosol 325 mg (65 mg elemental iron) oral tablet  -- 1 tab(s) by mouth once a day  -- Check with your doctor before becoming pregnant.  Do not chew, break, or crush.  May discolor urine or feces.    -- Indication: For Anemia    midodrine 10 mg oral tablet  -- 1 tab(s) by mouth 3 times a day, hold if systolic BP more than 110  -- Indication: For for low BP    amoxicillin-clavulanate 500 mg-125 mg oral tablet  -- 1 tab(s) by mouth once a day x 6 more days  -- Indication: For Urinary tract infection associated with indwelling urethral catheter, initial encounter    pantoprazole 40 mg oral delayed release tablet  -- 1 tab(s) by mouth once a day (before a meal)  -- Indication: For Heart burn/GERD I will START or STAY ON the medications listed below when I get home from the hospital:    Bed side commode  -- Dx: CHF I50.1, CAD I25.1, HTN I10  -- Indication: For CHF (congestive heart failure)    Shower chair  -- Dx: CHF I50.1, CAD I25.1, HTN I10  -- Indication: For CHF (congestive heart failure)    Home PT  -- Dx: generalized deconditioning, I50, I10  -- Indication: For CHF (congestive heart failure)    finasteride 5 mg oral tablet  -- 1 tab(s) by mouth once a day  -- Indication: For BPH (benign prostatic hypertrophy)    Aspirin Enteric Coated 81 mg oral delayed release tablet  -- 1 tab(s) by mouth once a day  -- Indication: For for heart    tamsulosin 0.4 mg oral capsule  -- 1 cap(s) by mouth once a day  -- Indication: For BPH (benign prostatic hypertrophy)    atorvastatin 40 mg oral tablet  -- 1 tab(s) by mouth once a day (at bedtime)  -- Indication: For for cholesterol    clopidogrel 75 mg oral tablet  -- 1 tab(s) by mouth once a day  -- Indication: For for heart    carvedilol 3.125 mg oral tablet  -- 1 tab(s) by mouth every 12 hours, hold if Systolic BP less than 90  -- Indication: For CHF (congestive heart failure)    Advair Diskus 250 mcg-50 mcg inhalation powder  -- 1 puff(s) inhaled 2 times a day  -- Check with your doctor before becoming pregnant.  For inhalation only.  Obtain medical advice before taking any non-prescription drugs as some may affect the action of this medication.  Rinse mouth thoroughly after use.    -- Indication: For Shortness of breath    albuterol CFC free 90 mcg/inh inhalation aerosol  -- 2 puff(s) inhaled every 6 hours as needed for shortness of braeth or wheezing.  -- For inhalation only.  It is very important that you take or use this exactly as directed.  Do not skip doses or discontinue unless directed by your doctor.  Obtain medical advice before taking any non-prescription drugs as some may affect the action of this medication.  Shake well before use.    -- Indication: For Shortness of breath    Feosol 325 mg (65 mg elemental iron) oral tablet  -- 1 tab(s) by mouth once a day  -- Check with your doctor before becoming pregnant.  Do not chew, break, or crush.  May discolor urine or feces.    -- Indication: For Anemia    midodrine 10 mg oral tablet  -- 1 tab(s) by mouth 3 times a day, hold if systolic BP more than 110  -- Indication: For for low BP    amoxicillin-clavulanate 500 mg-125 mg oral tablet  -- 1 tab(s) by mouth once a day x 6 more days  -- Indication: For UTI (urinary tract infection)    pantoprazole 40 mg oral delayed release tablet  -- 1 tab(s) by mouth once a day (before a meal)  -- Indication: For Heart burn

## 2017-01-05 NOTE — DISCHARGE NOTE ADULT - PATIENT PORTAL LINK FT
“You can access the [facility] Patient Portal, offered by [facility], by registering with the following website: [email or web address]”

## 2017-01-05 NOTE — DISCHARGE NOTE ADULT - ADDITIONAL INSTRUCTIONS
Keep follow up with PCP, nephrologist, urologist and cardiologist. Keep follow up with PCP, nephrologist, urologist and cardiologist.  FU with PCP Dr. Aguilera x 3 days

## 2017-01-05 NOTE — PROGRESS NOTE ADULT - SUBJECTIVE AND OBJECTIVE BOX
Subjective: no distress      MEDICATIONS  (STANDING):  ferrous    sulfate 325milliGRAM(s) Oral daily  fluticasone / salmeterol 250-50 MICROgram(s) Diskus 1Dose(s) Inhalation two times a day  pantoprazole    Tablet 40milliGRAM(s) Oral before breakfast  aspirin enteric coated 81milliGRAM(s) Oral daily  clopidogrel Tablet 75milliGRAM(s) Oral daily  atorvastatin 40milliGRAM(s) Oral at bedtime  tamsulosin 0.4milliGRAM(s) Oral at bedtime  finasteride 5milliGRAM(s) Oral daily  midodrine 10milliGRAM(s) Oral three times a day  sodium chloride 0.9%. 1000milliLiter(s) IV Continuous <Continuous>  amoxicillin  500 milliGRAM(s)/clavulanate 1Tablet(s) Oral daily  carvedilol 3.125milliGRAM(s) Oral every 12 hours    MEDICATIONS  (PRN):  ALBUTerol    90 MICROgram(s) HFA Inhaler 2Puff(s) Inhalation every 6 hours PRN Shortness of Breath and/or Wheezing          T(C): 37.1, Max: 37.7 (01-05 @ 01:48)  HR: 76 (76 - 105)  BP: 90/40 (90/40 - 120/68)  RR: 16 (16 - 18)  SpO2: 99% (97% - 99%)        I&O's Detail  460/800         PHYSICAL EXAM:    GENERAL: no distress  EYES: EOMI, PERRLA, conjunctiva and sclera clear  NECK: Supple, no inc in JVP  CHEST/LUNG: Clear  HEART: S1S2  ABDOMEN: Soft, Nontender, Nondistended; Bowel sounds present. Indwelling Salcido  EXTREMITIES:  no edema  NEURO: no asterixis      LABS:  CBC Full  -  ( 04 Jan 2017 06:18 )  WBC Count : 11.7 K/uL  Hemoglobin : 10.0 g/dL  Hematocrit : 28.6 %  Platelet Count - Automated : 179 K/uL  Mean Cell Volume : 87.1 fl  Mean Cell Hemoglobin : 30.4 pg  Mean Cell Hemoglobin Concentration : 35.0 gm/dL  Auto Neutrophil # : x  Auto Lymphocyte # : x  Auto Monocyte # : x  Auto Eosinophil # : x  Auto Basophil # : x  Auto Neutrophil % : x  Auto Lymphocyte % : x  Auto Monocyte % : x  Auto Eosinophil % : x  Auto Basophil % : x    04 Jan 2017 06:18    138    |  102    |  73     ----------------------------<  84     4.0     |  26     |  4.63     Ca    8.5        04 Jan 2017 06:18  Phos  2.9       03 Jan 2017 21:44  Mg     2.2       04 Jan 2017 06:18          ASSESSMENT and PLAN:    ROSAURA on CKD 4. ? obstructive. Peak Bun/Cr 104/6.3. Resolving. Maintain Salcido, follow Cr trend. Avoid nephrotoxins. Cr Cl 15-20cc/min

## 2017-01-05 NOTE — DISCHARGE NOTE ADULT - HOSPITAL COURSE
Patient is 89 yr old Black male with PMH of  BPH, CKD, chronic Jc catheter, HTN, CAD 3 stents, CHF recent Echo 11/4/2016 EF 45-50% with grade II diastolic dysfunction  presenting with hypotension systolic 90's and   generally feeling "not good" but cannot describe this specifically.  pt's jc found with  multiple clots and hematuria. Patient was recently admitted to [place] 11/21-11/23. Patient is a poor historian and unable to elaborate more on how he is feeling. Patient is very thin with BMI 20 . Patient denies nausea, vomiting, diarrhea, constipation, abd pain, cough, cold , sob, headache, blurry vision or cp or palp.  While in the Er, patient received 4 liters of NS , wbc 20 lactate 1.6, worsening bun/creat 104/6.53 compared to 11/21 55/3.91 , , UA protein, TNTC rbc and wbcs, leukocyte esterase, Blood culture x 2 and Urine culture sent, IV zosyn and vancomycin given. Consults ID Dr Wallace . Dr Gilbert and Dr Sanches called by Er Doc Zaheer who agreed to see the patient.  Patient started on IV Abx, ID consulted, cultures , wbc and vitals followed, anti- HTN held , started on midodrine, Nephrology and Urology consulted, supportive management, hematuria resolved , now FC draining clear urine, during admission noted to have an episode of NSVT , cardiology consulted, advised to resume coreg, pateint clinically improved, WBC's trending down and he feels better.  BP on low side, discussed with daughter, she states at home she checks his BP and it runs like in 100/60 and 90/50.  PT consulted, recommend home with home PT, patient walks with help of cane.    D/W cardiology, nephrology and ID, IV Abx switched to PO, discussed with daughter and SW, will DC home with Home services and home PT. Patient is 89 yr old Black male with PMH of  BPH, CKD, chronic Jc catheter, HTN, CAD 3 stents, CHF recent Echo 11/4/2016 EF 45-50% with grade II diastolic dysfunction  presenting with hypotension systolic 90's and   generally feeling "not good" but cannot describe this specifically.  pt's jc found with  multiple clots and hematuria. Patient was recently admitted to [place] 11/21-11/23. Patient is a poor historian and unable to elaborate more on how he is feeling. Patient is very thin with BMI 20 . Patient denies nausea, vomiting, diarrhea, constipation, abd pain, cough, cold , sob, headache, blurry vision or cp or palp.  While in the Er, patient received 4 liters of NS , wbc 20 lactate 1.6, worsening bun/creat 104/6.53 compared to 11/21 55/3.91 , , UA protein, TNTC rbc and wbcs, leukocyte esterase, Blood culture x 2 and Urine culture sent, IV zosyn and vancomycin given. Consults ID Dr Wallace . Dr Gilbert and Dr Sanches called by Er Doc Zaheer who agreed to see the patient.  Patient started on IV Abx, ID consulted, cultures , wbc and vitals followed, anti- HTN held , started on midodrine, Nephrology and Urology consulted, supportive management, hematuria resolved , now FC draining clear urine, during admission noted to have an episode of NSVT , cardiology consulted, advised to resume coreg, pateint clinically improved, WBC's trending down and he feels better.  BP on low normal side, discussed with daughter Coral, she states at home she checks his BP and it runs like in 100/60 and 90/50. d/w daughter, Given PCM and weight loss patient needs to drink suplena and encourage PO intake of renal diet.  PT consulted, recommend home with home PT, patient walks with help of cane.    D/W cardiology, nephrology and ID, IV Abx switched to PO, discussed with daughter and SW, will DC home with Home services and home PT.

## 2017-01-05 NOTE — DISCHARGE NOTE ADULT - CONDITIONS AT DISCHARGE
fair  >40 minutes spent in direct patient care including physical examination, discharge instructions , medication instructions and follow up, patient's daughter Coral verbalized understanding and agreed.

## 2017-01-05 NOTE — PROGRESS NOTE ADULT - PROBLEM SELECTOR PLAN 1
CT noted. Now resolved
Salcido clear
Sec to chronic indwelling catheter ,changed in Er, c/s growing > 3 species.  Switch to PO augmentin for 7 more days
Sec to chronic indwelling catheter ,changed in Er, c/s growing > 3 species.  toelrating PO augmentin ,cont for for 6 more days
continue current antibiotics  infectious disease Follow up  check culture and sensitivity
ID evaluation appreciated  c/w IV Abx  monitor wbc and temp curve
resolving, leukocytosis improving

## 2017-01-05 NOTE — PROGRESS NOTE ADULT - PROBLEM SELECTOR PROBLEM 2
Acute cystitis with hematuria
Benign prostatic hypertrophy with urinary retention
Urinary tract infection associated with indwelling urethral catheter, initial encounter
Urinary tract infection associated with indwelling urethral catheter, initial encounter

## 2017-01-05 NOTE — DISCHARGE NOTE ADULT - CARE PROVIDER_API CALL
Gene Gilbert), Internal Medicine; Nephrology  [address] [facility] [address] [place] [id number]  Phone: [phone]  Fax: [phone]    Geoff Sanches), Urology  [address] [place] [address]  Phone: [phone]  Fax: [phone]    Randall Luna), Cardiology; Cardiovascular Disease  [address] [place] [address]  Phone: [phone]  Fax: [phone] Gene Gilbert), Internal Medicine; Nephrology  [address] [facility] [address] [place] [id number]  Phone: [phone]  Fax: [phone]    Geoff Sanches), Urology  [address] [place] [address]  Phone: [phone]  Fax: [phone]    Randall Luna), Cardiology; Cardiovascular Disease  [address] [place] [address]  Phone: [phone]  Fax: [phone]    Lauryn Aguilera), Internal Medicine  [address] [place] [address]  Phone: [phone]  Fax: [phone]

## 2017-01-05 NOTE — DISCHARGE NOTE ADULT - PLAN OF CARE
sepsis secondary to UTI- you received IV antibiotics during hospital stay, now take oral antibiotics for 6 more days and finish course Ischemic ATN/ROSAURA due to pre renal azotemia in setting of sepsis and UTI on CKD stage 4, avoid nephrotoxic agents (for example motrin, advil ), Follow up with your kidney doctor within one week Take suplena 1 Can x 2 times a day Midodrine is started for hypotension, follow with your PCP for regular BP monitoring and any medication dose adjustment Continue home medications. Keep follow up with cardiology/heart doctor x within 1 week. complete resolution of symptoms Complete oral antibiotics course as directed. 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  Get your jc catheter changed regularly by your urologist as advised. Follow with Urology 1 to 2 weeks (your jc catheter was changed in ER at the time of this admission)

## 2017-01-05 NOTE — PROGRESS NOTE ADULT - PROBLEM SELECTOR PROBLEM 3
Benign prostatic hypertrophy with urinary retention
Renal insufficiency
Renal insufficiency
Acute cystitis with hematuria
Acute cystitis with hematuria
Malnourished
Mild protein-calorie malnutrition

## 2017-01-05 NOTE — DISCHARGE NOTE ADULT - PROVIDER TOKENS
TOKEN:'[id number]',[id number]:'[id number]' TOKEN:'[id number]:'[id number]'

## 2017-01-05 NOTE — PROGRESS NOTE ADULT - PROBLEM SELECTOR PLAN 2
Continue jc and follow labs
Salcido draining well
on indwelling catheter
on indwelling catheter
urology Follow up
complicated UTI  c/w IV Abx as per ID recommendations, will follow with ID for change to oral abx
Sec to chronic indwelling catheter ,changed in Er, c/s growing > 3 species.  Cont Zosyn.  Switch to oral likely in am

## 2017-01-05 NOTE — PROGRESS NOTE ADULT - SUBJECTIVE AND OBJECTIVE BOX
Patient is a 89y old  Male who presents with a chief complaint of Patient 89 yr old black male complaining of malaise and not feeling good (02 Jan 2017 03:18)      INTERVAL HPI / OVERNIGHT EVENTS:Feeling better, no fever, no abdominal pain    MEDICATIONS  (STANDING):  ferrous    sulfate 325milliGRAM(s) Oral daily  fluticasone / salmeterol 250-50 MICROgram(s) Diskus 1Dose(s) Inhalation two times a day  pantoprazole    Tablet 40milliGRAM(s) Oral before breakfast  aspirin enteric coated 81milliGRAM(s) Oral daily  clopidogrel Tablet 75milliGRAM(s) Oral daily  atorvastatin 40milliGRAM(s) Oral at bedtime  tamsulosin 0.4milliGRAM(s) Oral at bedtime  finasteride 5milliGRAM(s) Oral daily  piperacillin/tazobactam IVPB. 3.375Gram(s) IV Intermittent every 12 hours  midodrine 10milliGRAM(s) Oral three times a day  vancomycin  IVPB 500milliGRAM(s) IV Intermittent every 72 hours  sodium chloride 0.9%. 1000milliLiter(s) IV Continuous <Continuous>    MEDICATIONS  (PRN):  ALBUTerol    90 MICROgram(s) HFA Inhaler 2Puff(s) Inhalation every 6 hours PRN Shortness of Breath and/or Wheezing      Vital Signs Last 24 Hrs  Tmax-afebrile  PHYSICAL EXAM:    Constitutional: NAD, well-groomed, well-developed  Respiratory: CTAB/L  Cardiovascular: S1 and S2, RRR, no M/G/R  Gastrointestinal: BS+, soft, NT/ND  Extremities: No peripheral edema  Vascular: 2+ peripheral pulses  Skin: No rashes  Salcido + clear urine    LABS:             WBC 12.1--> 11.7      MICROBIOLOGY:     Blood Culture:  01-01 @ 20:39  Culture - Blood - --  Culture Results -   No growth to date.  Gram Stain - --  Gram Stain Blood - --  Organism - --  Organism Identification - --  Specimen Source - .Blood Blood                        01-01 @ 20:38  Culture - Urine - --  Culture Results -   Culture grew 3 or more types of organisms which indicate  collection contamination; consider recollection only if clinically  indicated.  >100,000 CFU/ml Mixed gram negative rods  >100,000 CFU/ml Gram positive organisms  Method Type - --  Organism Identification - --  Specimen Source - .Urine Catheterized        Surgical Site:      [facility] Culture:  01-01 @ 20:39  Culture Results -   No growth to date.  Gram Stain - --  Gram Stain Result - --  Organism - --  Organism Identification - --  Specimen Source - .Blood Blood          AFB:        RADIOLOGY & ADDITIONAL STUDIES:

## 2017-01-05 NOTE — DISCHARGE NOTE ADULT - SECONDARY DIAGNOSIS.
Chronic indwelling Salcido catheter Combined systolic and diastolic congestive heart failure, unspecified congestive heart failure chronicity Hypotension, unspecified hypotension type Mild protein-calorie malnutrition ATN (acute tubular necrosis) Sepsis, due to unspecified organism

## 2017-01-06 LAB
CULTURE RESULTS: SIGNIFICANT CHANGE UP
CULTURE RESULTS: SIGNIFICANT CHANGE UP
SPECIMEN SOURCE: SIGNIFICANT CHANGE UP
SPECIMEN SOURCE: SIGNIFICANT CHANGE UP

## 2017-01-09 DIAGNOSIS — N17.0 ACUTE KIDNEY FAILURE WITH TUBULAR NECROSIS: ICD-10-CM

## 2017-01-09 DIAGNOSIS — N30.01 ACUTE CYSTITIS WITH HEMATURIA: ICD-10-CM

## 2017-01-09 DIAGNOSIS — E78.5 HYPERLIPIDEMIA, UNSPECIFIED: ICD-10-CM

## 2017-01-09 DIAGNOSIS — R33.8 OTHER RETENTION OF URINE: ICD-10-CM

## 2017-01-09 DIAGNOSIS — N47.8 OTHER DISORDERS OF PREPUCE: ICD-10-CM

## 2017-01-09 DIAGNOSIS — I47.2 VENTRICULAR TACHYCARDIA: ICD-10-CM

## 2017-01-09 DIAGNOSIS — R39.2 EXTRARENAL UREMIA: ICD-10-CM

## 2017-01-09 DIAGNOSIS — R12 HEARTBURN: ICD-10-CM

## 2017-01-09 DIAGNOSIS — I50.40 UNSPECIFIED COMBINED SYSTOLIC (CONGESTIVE) AND DIASTOLIC (CONGESTIVE) HEART FAILURE: ICD-10-CM

## 2017-01-09 DIAGNOSIS — I99.8 OTHER DISORDER OF CIRCULATORY SYSTEM: ICD-10-CM

## 2017-01-09 DIAGNOSIS — Z95.5 PRESENCE OF CORONARY ANGIOPLASTY IMPLANT AND GRAFT: ICD-10-CM

## 2017-01-09 DIAGNOSIS — N18.4 CHRONIC KIDNEY DISEASE, STAGE 4 (SEVERE): ICD-10-CM

## 2017-01-09 DIAGNOSIS — T83.518A INFECTION AND INFLAMMATORY REACTION DUE TO OTHER URINARY CATHETER, INITIAL ENCOUNTER: ICD-10-CM

## 2017-01-09 DIAGNOSIS — A41.9 SEPSIS, UNSPECIFIED ORGANISM: ICD-10-CM

## 2017-01-09 DIAGNOSIS — I25.10 ATHEROSCLEROTIC HEART DISEASE OF NATIVE CORONARY ARTERY WITHOUT ANGINA PECTORIS: ICD-10-CM

## 2017-01-09 DIAGNOSIS — Z87.891 PERSONAL HISTORY OF NICOTINE DEPENDENCE: ICD-10-CM

## 2017-01-09 DIAGNOSIS — I12.9 HYPERTENSIVE CHRONIC KIDNEY DISEASE WITH STAGE 1 THROUGH STAGE 4 CHRONIC KIDNEY DISEASE, OR UNSPECIFIED CHRONIC KIDNEY DISEASE: ICD-10-CM

## 2017-01-09 DIAGNOSIS — N47.1 PHIMOSIS: ICD-10-CM

## 2017-01-09 DIAGNOSIS — E44.1 MILD PROTEIN-CALORIE MALNUTRITION: ICD-10-CM

## 2017-01-09 DIAGNOSIS — N40.1 BENIGN PROSTATIC HYPERPLASIA WITH LOWER URINARY TRACT SYMPTOMS: ICD-10-CM

## 2017-02-11 ENCOUNTER — INPATIENT (INPATIENT)
Facility: HOSPITAL | Age: 82
LOS: 2 days | Discharge: HOME | End: 2017-02-14
Attending: INTERNAL MEDICINE | Admitting: INTERNAL MEDICINE
Payer: MEDICARE

## 2017-02-11 VITALS
RESPIRATION RATE: 16 BRPM | SYSTOLIC BLOOD PRESSURE: 103 MMHG | DIASTOLIC BLOOD PRESSURE: 56 MMHG | WEIGHT: 139.99 LBS | TEMPERATURE: 98 F | HEART RATE: 82 BPM | HEIGHT: 70 IN

## 2017-02-11 DIAGNOSIS — E03.9 HYPOTHYROIDISM, UNSPECIFIED: ICD-10-CM

## 2017-02-11 DIAGNOSIS — C18.9 MALIGNANT NEOPLASM OF COLON, UNSPECIFIED: ICD-10-CM

## 2017-02-11 DIAGNOSIS — D64.9 ANEMIA, UNSPECIFIED: ICD-10-CM

## 2017-02-11 DIAGNOSIS — E87.5 HYPERKALEMIA: ICD-10-CM

## 2017-02-11 DIAGNOSIS — Z92.89 PERSONAL HISTORY OF OTHER MEDICAL TREATMENT: Chronic | ICD-10-CM

## 2017-02-11 DIAGNOSIS — N17.9 ACUTE KIDNEY FAILURE, UNSPECIFIED: ICD-10-CM

## 2017-02-11 DIAGNOSIS — I25.10 ATHEROSCLEROTIC HEART DISEASE OF NATIVE CORONARY ARTERY WITHOUT ANGINA PECTORIS: ICD-10-CM

## 2017-02-11 LAB
ALBUMIN SERPL ELPH-MCNC: 2.5 G/DL — LOW (ref 3.3–5)
ALP SERPL-CCNC: 75 U/L — SIGNIFICANT CHANGE UP (ref 40–120)
ALT FLD-CCNC: 33 U/L — SIGNIFICANT CHANGE UP (ref 12–78)
ANION GAP SERPL CALC-SCNC: 9 MMOL/L — SIGNIFICANT CHANGE UP (ref 5–17)
ANISOCYTOSIS BLD QL: SLIGHT — SIGNIFICANT CHANGE UP
APPEARANCE UR: CLEAR — SIGNIFICANT CHANGE UP
APTT BLD: 34.7 SEC — SIGNIFICANT CHANGE UP (ref 27.5–37.4)
AST SERPL-CCNC: 36 U/L — SIGNIFICANT CHANGE UP (ref 15–37)
BASOPHILS # BLD AUTO: 0 K/UL — SIGNIFICANT CHANGE UP (ref 0–0.2)
BASOPHILS NFR BLD AUTO: 0.4 % — SIGNIFICANT CHANGE UP (ref 0–2)
BILIRUB SERPL-MCNC: 0.4 MG/DL — SIGNIFICANT CHANGE UP (ref 0.2–1.2)
BILIRUB UR-MCNC: NEGATIVE — SIGNIFICANT CHANGE UP
BLD GP AB SCN SERPL QL: SIGNIFICANT CHANGE UP
BUN SERPL-MCNC: 107 MG/DL — HIGH (ref 7–23)
BURR CELLS BLD QL SMEAR: PRESENT — SIGNIFICANT CHANGE UP
CALCIUM SERPL-MCNC: 8.9 MG/DL — SIGNIFICANT CHANGE UP (ref 8.5–10.1)
CHLORIDE SERPL-SCNC: 97 MMOL/L — SIGNIFICANT CHANGE UP (ref 96–108)
CO2 SERPL-SCNC: 32 MMOL/L — HIGH (ref 22–31)
COLOR SPEC: YELLOW — SIGNIFICANT CHANGE UP
CREAT SERPL-MCNC: 5.11 MG/DL — HIGH (ref 0.5–1.3)
DIFF PNL FLD: ABNORMAL
ELLIPTOCYTES BLD QL SMEAR: SLIGHT — SIGNIFICANT CHANGE UP
EOSINOPHIL # BLD AUTO: 0.8 K/UL — HIGH (ref 0–0.5)
EOSINOPHIL NFR BLD AUTO: 9.4 % — HIGH (ref 0–6)
GLUCOSE SERPL-MCNC: 86 MG/DL — SIGNIFICANT CHANGE UP (ref 70–99)
GLUCOSE UR QL: NEGATIVE MG/DL — SIGNIFICANT CHANGE UP
HCT VFR BLD CALC: 24.2 % — LOW (ref 39–50)
HGB BLD-MCNC: 8.4 G/DL — LOW (ref 13–17)
HYPOCHROMIA BLD QL: SLIGHT — SIGNIFICANT CHANGE UP
INR BLD: 1.16 RATIO — SIGNIFICANT CHANGE UP (ref 0.88–1.16)
KETONES UR-MCNC: NEGATIVE — SIGNIFICANT CHANGE UP
LEUKOCYTE ESTERASE UR-ACNC: ABNORMAL
LYMPHOCYTES # BLD AUTO: 1.7 K/UL — SIGNIFICANT CHANGE UP (ref 1–3.3)
LYMPHOCYTES # BLD AUTO: 20.3 % — SIGNIFICANT CHANGE UP (ref 13–44)
MACROCYTES BLD QL: SLIGHT — SIGNIFICANT CHANGE UP
MCHC RBC-ENTMCNC: 30.1 PG — SIGNIFICANT CHANGE UP (ref 27–34)
MCHC RBC-ENTMCNC: 34.8 GM/DL — SIGNIFICANT CHANGE UP (ref 32–36)
MCV RBC AUTO: 86.5 FL — SIGNIFICANT CHANGE UP (ref 80–100)
MICROCYTES BLD QL: SLIGHT — SIGNIFICANT CHANGE UP
MONOCYTES # BLD AUTO: 0.6 K/UL — SIGNIFICANT CHANGE UP (ref 0–0.9)
MONOCYTES NFR BLD AUTO: 7.5 % — SIGNIFICANT CHANGE UP (ref 2–14)
NEUTROPHILS # BLD AUTO: 5.1 K/UL — SIGNIFICANT CHANGE UP (ref 1.8–7.4)
NEUTROPHILS NFR BLD AUTO: 61.7 % — SIGNIFICANT CHANGE UP (ref 43–77)
NITRITE UR-MCNC: POSITIVE
OB PNL STL: POSITIVE
OVALOCYTES BLD QL SMEAR: SLIGHT — SIGNIFICANT CHANGE UP
PH UR: 8 — SIGNIFICANT CHANGE UP (ref 4.8–8)
PLAT MORPH BLD: NORMAL — SIGNIFICANT CHANGE UP
PLATELET # BLD AUTO: 219 K/UL — SIGNIFICANT CHANGE UP (ref 150–400)
POIKILOCYTOSIS BLD QL AUTO: SLIGHT — SIGNIFICANT CHANGE UP
POLYCHROMASIA BLD QL SMEAR: SLIGHT — SIGNIFICANT CHANGE UP
POTASSIUM SERPL-MCNC: 5.3 MMOL/L — SIGNIFICANT CHANGE UP (ref 3.5–5.3)
POTASSIUM SERPL-SCNC: 5.3 MMOL/L — SIGNIFICANT CHANGE UP (ref 3.5–5.3)
PROT SERPL-MCNC: 6.6 GM/DL — SIGNIFICANT CHANGE UP (ref 6–8.3)
PROT UR-MCNC: 30 MG/DL
PROTHROM AB SERPL-ACNC: 13 SEC — SIGNIFICANT CHANGE UP (ref 10–13.1)
RBC # BLD: 2.79 M/UL — LOW (ref 4.2–5.8)
RBC # FLD: 13.8 % — SIGNIFICANT CHANGE UP (ref 11–15)
RBC BLD AUTO: ABNORMAL
SCHISTOCYTES BLD QL AUTO: SLIGHT — SIGNIFICANT CHANGE UP
SODIUM SERPL-SCNC: 138 MMOL/L — SIGNIFICANT CHANGE UP (ref 135–145)
SP GR SPEC: 1.01 — SIGNIFICANT CHANGE UP (ref 1.01–1.02)
TSH SERPL-MCNC: 78.7 UU/ML — HIGH (ref 0.36–3.74)
UROBILINOGEN FLD QL: NEGATIVE MG/DL — SIGNIFICANT CHANGE UP
WBC # BLD: 8.3 K/UL — SIGNIFICANT CHANGE UP (ref 3.8–10.5)
WBC # FLD AUTO: 8.3 K/UL — SIGNIFICANT CHANGE UP (ref 3.8–10.5)

## 2017-02-11 PROCEDURE — 71010: CPT | Mod: 26

## 2017-02-11 PROCEDURE — 99284 EMERGENCY DEPT VISIT MOD MDM: CPT

## 2017-02-11 RX ORDER — TAMSULOSIN HYDROCHLORIDE 0.4 MG/1
0.4 CAPSULE ORAL AT BEDTIME
Qty: 0 | Refills: 0 | Status: DISCONTINUED | OUTPATIENT
Start: 2017-02-11 | End: 2017-02-14

## 2017-02-11 RX ORDER — ASPIRIN/CALCIUM CARB/MAGNESIUM 324 MG
81 TABLET ORAL DAILY
Qty: 0 | Refills: 0 | Status: DISCONTINUED | OUTPATIENT
Start: 2017-02-11 | End: 2017-02-14

## 2017-02-11 RX ORDER — FINASTERIDE 5 MG/1
5 TABLET, FILM COATED ORAL DAILY
Qty: 0 | Refills: 0 | Status: DISCONTINUED | OUTPATIENT
Start: 2017-02-11 | End: 2017-02-14

## 2017-02-11 RX ORDER — PANTOPRAZOLE SODIUM 20 MG/1
40 TABLET, DELAYED RELEASE ORAL
Qty: 0 | Refills: 0 | Status: DISCONTINUED | OUTPATIENT
Start: 2017-02-11 | End: 2017-02-14

## 2017-02-11 RX ORDER — CARVEDILOL PHOSPHATE 80 MG/1
3.12 CAPSULE, EXTENDED RELEASE ORAL EVERY 12 HOURS
Qty: 0 | Refills: 0 | Status: DISCONTINUED | OUTPATIENT
Start: 2017-02-11 | End: 2017-02-14

## 2017-02-11 RX ORDER — ALBUTEROL 90 UG/1
2 AEROSOL, METERED ORAL EVERY 6 HOURS
Qty: 0 | Refills: 0 | Status: DISCONTINUED | OUTPATIENT
Start: 2017-02-11 | End: 2017-02-14

## 2017-02-11 RX ORDER — FERROUS SULFATE 325(65) MG
325 TABLET ORAL DAILY
Qty: 0 | Refills: 0 | Status: DISCONTINUED | OUTPATIENT
Start: 2017-02-11 | End: 2017-02-14

## 2017-02-11 RX ORDER — CLOPIDOGREL BISULFATE 75 MG/1
75 TABLET, FILM COATED ORAL DAILY
Qty: 0 | Refills: 0 | Status: DISCONTINUED | OUTPATIENT
Start: 2017-02-11 | End: 2017-02-14

## 2017-02-11 RX ORDER — SODIUM CHLORIDE 9 MG/ML
1000 INJECTION INTRAMUSCULAR; INTRAVENOUS; SUBCUTANEOUS
Qty: 0 | Refills: 0 | Status: DISCONTINUED | OUTPATIENT
Start: 2017-02-11 | End: 2017-02-14

## 2017-02-11 RX ORDER — FLUTICASONE PROPIONATE AND SALMETEROL 50; 250 UG/1; UG/1
1 POWDER ORAL; RESPIRATORY (INHALATION)
Qty: 0 | Refills: 0 | Status: DISCONTINUED | OUTPATIENT
Start: 2017-02-11 | End: 2017-02-14

## 2017-02-11 RX ORDER — ATORVASTATIN CALCIUM 80 MG/1
40 TABLET, FILM COATED ORAL AT BEDTIME
Qty: 0 | Refills: 0 | Status: DISCONTINUED | OUTPATIENT
Start: 2017-02-11 | End: 2017-02-14

## 2017-02-11 RX ORDER — SODIUM POLYSTYRENE SULFONATE 4.1 MEQ/G
30 POWDER, FOR SUSPENSION ORAL ONCE
Qty: 0 | Refills: 0 | Status: COMPLETED | OUTPATIENT
Start: 2017-02-11 | End: 2017-02-11

## 2017-02-11 RX ADMIN — SODIUM POLYSTYRENE SULFONATE 30 GRAM(S): 4.1 POWDER, FOR SUSPENSION ORAL at 23:44

## 2017-02-11 RX ADMIN — SODIUM CHLORIDE 80 MILLILITER(S): 9 INJECTION INTRAMUSCULAR; INTRAVENOUS; SUBCUTANEOUS at 18:40

## 2017-02-11 RX ADMIN — TAMSULOSIN HYDROCHLORIDE 0.4 MILLIGRAM(S): 0.4 CAPSULE ORAL at 23:43

## 2017-02-11 RX ADMIN — ATORVASTATIN CALCIUM 40 MILLIGRAM(S): 80 TABLET, FILM COATED ORAL at 23:43

## 2017-02-11 NOTE — H&P ADULT. - PMH
BPH (benign prostatic hypertrophy)    Cardiac anomaly    CHF (congestive heart failure)    Hyperlipidemia    Hypertension    Malignant neoplasm of colon, unspecified part of colon

## 2017-02-11 NOTE — DISCHARGE NOTE ADULT - MEDICATION SUMMARY - MEDICATIONS TO TAKE
I will START or STAY ON the medications listed below when I get home from the hospital:    finasteride 5 mg oral tablet  -- 1 tab(s) by mouth once a day  -- Indication: For enlarged prostrate    Aspirin Enteric Coated 81 mg oral delayed release tablet  -- 1 tab(s) by mouth once a day  -- Indication: For CAD (coronary artery disease)    tamsulosin 0.4 mg oral capsule  -- 1 cap(s) by mouth once a day  -- Indication: For enlrged prostrate    atorvastatin 40 mg oral tablet  -- 1 tab(s) by mouth once a day (at bedtime)  -- Indication: For HLD    clopidogrel 75 mg oral tablet  -- 1 tab(s) by mouth once a day  -- Indication: For CAD (coronary artery disease)    carvedilol 3.125 mg oral tablet  -- 1 tab(s) by mouth every 12 hours, hold if Systolic BP less than 90  -- Indication: For CAD (coronary artery disease)    Advair Diskus 250 mcg-50 mcg inhalation powder  -- 1 puff(s) inhaled 2 times a day  -- Check with your doctor before becoming pregnant.  For inhalation only.  Obtain medical advice before taking any non-prescription drugs as some may affect the action of this medication.  Rinse mouth thoroughly after use.    -- Indication: For Copd    albuterol CFC free 90 mcg/inh inhalation aerosol  -- 2 puff(s) inhaled every 6 hours as needed for shortness of braeth or wheezing.  -- For inhalation only.  It is very important that you take or use this exactly as directed.  Do not skip doses or discontinue unless directed by your doctor.  Obtain medical advice before taking any non-prescription drugs as some may affect the action of this medication.  Shake well before use.    -- Indication: For Copd    Feosol 325 mg (65 mg elemental iron) oral tablet  -- 1 tab(s) by mouth once a day  -- Check with your doctor before becoming pregnant.  Do not chew, break, or crush.  May discolor urine or feces.    -- Indication: For supplement    pantoprazole 40 mg oral delayed release tablet  -- 1 tab(s) by mouth once a day (before a meal)  -- Indication: For Gerd    levothyroxine 75 mcg (0.075 mg) oral capsule  -- 1 cap(s) by mouth once a day  -- Indication: For Hypothyroidism, unspecified type

## 2017-02-11 NOTE — DISCHARGE NOTE ADULT - MEDICATION SUMMARY - MEDICATIONS TO STOP TAKING
I will STOP taking the medications listed below when I get home from the hospital:    midodrine 10 mg oral tablet  -- 1 tab(s) by mouth 3 times a day, hold if systolic BP more than 110    amoxicillin-clavulanate 500 mg-125 mg oral tablet  -- 1 tab(s) by mouth once a day x 6 more days

## 2017-02-11 NOTE — ED PROVIDER NOTE - CARE PLAN
Principal Discharge DX:	Anemia, unspecified type  Secondary Diagnosis:	GI bleed not requiring more than 4 units of blood in 24 hours, ICU, or surgery  Secondary Diagnosis:	Renal failure (ARF), acute on chronic

## 2017-02-11 NOTE — DISCHARGE NOTE ADULT - PATIENT PORTAL LINK FT
“You can access the Follow[facility] Patient Portal, offered by [facility], by registering with the following website: [email or web address]”

## 2017-02-11 NOTE — DISCHARGE NOTE ADULT - HOSPITAL COURSE
patient was ent by PMD for elevated BUN. he has hx of colonic lesion , by colonoscopy and family was told he had colon cancer. He had no keke blood in stools but he had + guaic stools. He already has CKD stage 5 with elevated BUn and the recent elevation was attributed to GI bleed. he remained hemodynamically stable, was evaluated by Renal, GI nad Oncology. he received epoetin for his anemia from CKd. he wwas also discovered to heve elevated TSH levekls and was strted on levothyroxine. he will be dischrged for Op follow up by PMD, and oncology  and he will need folow up lb testing  for his hypothyroidism. . His daughter is awre of all the diagnosis wnd will follow up. Patient walks at home with walker. patient was ent by PMD for elevated BUN. he has hx of colonic lesion , by colonoscopy and family was told he had colon cancer. He had no keke blood in stools but he had + guaic stools. He already has CKD stage 5 with elevated BUn and the recent elevation was attributed to GI bleed. he remained hemodynamically stable, was evaluated by Renal, GI nad Oncology. he received epoetin for his anemia from CKd. he wwas also discovered to heve elevated TSH levekls and was strted on levothyroxine. he will be dischrged for Op follow up by PMD, and oncology  and he will need folow up lb testing  for his hypothyroidism. . His daughter is awre of all the diagnosis wnd will follow up. Patient walks at home with walker.. patients also had a BMI of less luisa 20 and was diagnosed with protien calorie malnutrition which was POA.

## 2017-02-11 NOTE — ED PROVIDER NOTE - SECONDARY DIAGNOSIS.
GI bleed not requiring more than 4 units of blood in 24 hours, ICU, or surgery Renal failure (ARF), acute on chronic

## 2017-02-11 NOTE — DISCHARGE NOTE ADULT - PLAN OF CARE
Hb stable. Hb is 8.3 on discharge. follow up with  gasteroenterologist, nephrologist and Oncologist as well as your cardiologist stage 5. . received  Epoetin in hospital chronic indwelling jc, and urologist started on levothoxine. PCP can follow up on repeat labs in one month.

## 2017-02-11 NOTE — ED PROVIDER NOTE - MEDICAL DECISION MAKING DETAILS
89yo male with hx of colon cancer with anemia, acute on chronic renal failure, and hypothyroidism on labs in PMD's office

## 2017-02-11 NOTE — DISCHARGE NOTE ADULT - SECONDARY DIAGNOSIS.
Anemia, unspecified type BPH (benign prostatic hypertrophy) CAD (coronary artery disease) Chronic indwelling Salcido catheter H/O colonoscopy Hypothyroidism, unspecified type

## 2017-02-11 NOTE — ED ADULT NURSE REASSESSMENT NOTE - NS ED NURSE REASSESS COMMENT FT1
pt awake, a+ox3,  pt has allewyn on sacrum for preventative measures.  skin clean and intact.  pt has 16Fr jc cathetered from home,

## 2017-02-11 NOTE — DISCHARGE NOTE ADULT - CARE PROVIDERS DIRECT ADDRESSES
,DirectAddress_Unknown,[email or web address],DirectAddress_Unknown,[email or web address],DirectAddress_Unknown,[email or web address],DirectAddress_Unknown,[email or web address]

## 2017-02-11 NOTE — DISCHARGE NOTE ADULT - CARE PLAN
Principal Discharge DX:	GI bleed not requiring more than 4 units of blood in 24 hours, ICU, or surgery  Goal:	Hb stable. Hb is 8.3 on discharge.  Instructions for follow-up, activity and diet:	follow up with  gasteroenterologist, nephrologist and Oncologist as well as your cardiologist  Secondary Diagnosis:	Anemia, unspecified type  Goal:	stage 5. . received  Epoetin in hospital  Secondary Diagnosis:	BPH (benign prostatic hypertrophy)  Goal:	chronic indwelling jc, and urologist  Secondary Diagnosis:	CAD (coronary artery disease)  Secondary Diagnosis:	Chronic indwelling Jc catheter  Secondary Diagnosis:	H/O colonoscopy  Secondary Diagnosis:	Hypothyroidism, unspecified type  Goal:	started on levothoxine. PCP can follow up on repeat labs in one month.

## 2017-02-11 NOTE — DISCHARGE NOTE ADULT - CARE PROVIDER_API CALL
Geoff Sanches), Urology  [address] [place] [address]  Phone: [phone]  Fax: [phone]    Mandeep Koroma), Hematology; Internal Medicine; Medical Oncology  [address] [facility] [address]  [place] [address]  Phone: [phone]  Fax: [phone]    Gene Gilbert), Internal Medicine; Nephrology  [address] [facility] [address] [place] [id number]  Phone: [phone]  Fax: [phone]    dr Nataliya MD,   [address] [place] [address]. [place] [address] call for appointment  Phone: [phone]  Fax: (   )    -

## 2017-02-11 NOTE — H&P ADULT. - HISTORY OF PRESENT ILLNESS
89yo male from home with his daughter and HHA who states they were called by the PMD and instructed to report to the Emergency Department.  After discussion w/ PMD it was noted that the patient had anemia, worsening renal failure, and elevated TSH to 100.  Pt has been noted by the family to have generalized weakness, inability to ambulate without assistance poor PO intake

## 2017-02-11 NOTE — DISCHARGE NOTE ADULT - REASON FOR ADMISSION
worsening of renal failure, uremia, anemia worsening of renal failure, uremia, anemia, Hypothyroidism/ colon cancer/ CAD

## 2017-02-12 LAB
ANION GAP SERPL CALC-SCNC: 10 MMOL/L — SIGNIFICANT CHANGE UP (ref 5–17)
BACTERIA # UR AUTO: ABNORMAL
BUN SERPL-MCNC: 105 MG/DL — HIGH (ref 7–23)
CALCIUM SERPL-MCNC: 8.4 MG/DL — LOW (ref 8.5–10.1)
CHLORIDE SERPL-SCNC: 101 MMOL/L — SIGNIFICANT CHANGE UP (ref 96–108)
CO2 SERPL-SCNC: 29 MMOL/L — SIGNIFICANT CHANGE UP (ref 22–31)
CREAT SERPL-MCNC: 4.79 MG/DL — HIGH (ref 0.5–1.3)
EPI CELLS # UR: SIGNIFICANT CHANGE UP
GLUCOSE SERPL-MCNC: 75 MG/DL — SIGNIFICANT CHANGE UP (ref 70–99)
HCT VFR BLD CALC: 23.9 % — LOW (ref 39–50)
HGB BLD-MCNC: 8.5 G/DL — LOW (ref 13–17)
MCHC RBC-ENTMCNC: 31.4 PG — SIGNIFICANT CHANGE UP (ref 27–34)
MCHC RBC-ENTMCNC: 35.6 GM/DL — SIGNIFICANT CHANGE UP (ref 32–36)
MCV RBC AUTO: 88.1 FL — SIGNIFICANT CHANGE UP (ref 80–100)
PLATELET # BLD AUTO: 206 K/UL — SIGNIFICANT CHANGE UP (ref 150–400)
POTASSIUM SERPL-MCNC: 4.7 MMOL/L — SIGNIFICANT CHANGE UP (ref 3.5–5.3)
POTASSIUM SERPL-SCNC: 4.7 MMOL/L — SIGNIFICANT CHANGE UP (ref 3.5–5.3)
RBC # BLD: 2.72 M/UL — LOW (ref 4.2–5.8)
RBC # FLD: 14.1 % — SIGNIFICANT CHANGE UP (ref 11–15)
RBC CASTS # UR COMP ASSIST: ABNORMAL /HPF (ref 0–4)
SODIUM SERPL-SCNC: 140 MMOL/L — SIGNIFICANT CHANGE UP (ref 135–145)
T3 SERPL-MCNC: 56 NG/DL — LOW (ref 80–200)
T4 AB SER-ACNC: 2.2 UG/DL — LOW (ref 4.6–12)
WBC # BLD: 7.6 K/UL — SIGNIFICANT CHANGE UP (ref 3.8–10.5)
WBC # FLD AUTO: 7.6 K/UL — SIGNIFICANT CHANGE UP (ref 3.8–10.5)
WBC UR QL: ABNORMAL

## 2017-02-12 RX ORDER — LEVOTHYROXINE SODIUM 125 MCG
50 TABLET ORAL DAILY
Qty: 0 | Refills: 0 | Status: DISCONTINUED | OUTPATIENT
Start: 2017-02-12 | End: 2017-02-14

## 2017-02-12 RX ORDER — LEVOTHYROXINE SODIUM 125 MCG
75 TABLET ORAL DAILY
Qty: 0 | Refills: 0 | Status: DISCONTINUED | OUTPATIENT
Start: 2017-02-12 | End: 2017-02-12

## 2017-02-12 RX ADMIN — Medication 325 MILLIGRAM(S): at 11:06

## 2017-02-12 RX ADMIN — PANTOPRAZOLE SODIUM 40 MILLIGRAM(S): 20 TABLET, DELAYED RELEASE ORAL at 07:36

## 2017-02-12 RX ADMIN — ATORVASTATIN CALCIUM 40 MILLIGRAM(S): 80 TABLET, FILM COATED ORAL at 22:19

## 2017-02-12 RX ADMIN — CLOPIDOGREL BISULFATE 75 MILLIGRAM(S): 75 TABLET, FILM COATED ORAL at 11:06

## 2017-02-12 RX ADMIN — FLUTICASONE PROPIONATE AND SALMETEROL 1 DOSE(S): 50; 250 POWDER ORAL; RESPIRATORY (INHALATION) at 06:04

## 2017-02-12 RX ADMIN — CARVEDILOL PHOSPHATE 3.12 MILLIGRAM(S): 80 CAPSULE, EXTENDED RELEASE ORAL at 06:37

## 2017-02-12 RX ADMIN — FINASTERIDE 5 MILLIGRAM(S): 5 TABLET, FILM COATED ORAL at 11:06

## 2017-02-12 RX ADMIN — Medication 81 MILLIGRAM(S): at 11:06

## 2017-02-12 RX ADMIN — TAMSULOSIN HYDROCHLORIDE 0.4 MILLIGRAM(S): 0.4 CAPSULE ORAL at 22:19

## 2017-02-12 RX ADMIN — FLUTICASONE PROPIONATE AND SALMETEROL 1 DOSE(S): 50; 250 POWDER ORAL; RESPIRATORY (INHALATION) at 17:15

## 2017-02-12 RX ADMIN — CARVEDILOL PHOSPHATE 3.12 MILLIGRAM(S): 80 CAPSULE, EXTENDED RELEASE ORAL at 17:15

## 2017-02-12 NOTE — CONSULT NOTE ADULT - ASSESSMENT
Advanced CKD, degree of hypovolemia. Agree with IVF, no immediate need for RRT. To monitor renal indices. Monitor HGB. Will follow with you.

## 2017-02-12 NOTE — PROGRESS NOTE ADULT - SUBJECTIVE AND OBJECTIVE BOX
Patient is a 89y old  Male who presents with a chief complaint of worsening renal failure, uremia, worsening anemia, hyperkalemia, Gi bleeding (2017 21:21)      INTERVAL HPI/OVERNIGHT EVENTS:    MEDICATIONS  (STANDING):  sodium chloride 0.9%. 1000milliLiter(s) IV Continuous <Continuous>  finasteride 5milliGRAM(s) Oral daily  aspirin enteric coated 81milliGRAM(s) Oral daily  tamsulosin 0.4milliGRAM(s) Oral at bedtime  atorvastatin 40milliGRAM(s) Oral at bedtime  clopidogrel Tablet 75milliGRAM(s) Oral daily  carvedilol 3.125milliGRAM(s) Oral every 12 hours  fluticasone / salmeterol 250-50 MICROgram(s) Diskus 1Dose(s) Inhalation two times a day  ferrous    sulfate 325milliGRAM(s) Oral daily  pantoprazole    Tablet 40milliGRAM(s) Oral before breakfast  levothyroxine 50MICROGram(s) Oral daily    MEDICATIONS  (PRN):  ALBUTerol    90 MICROgram(s) HFA Inhaler 2Puff(s) Inhalation every 6 hours PRN Shortness of Breath and/or Wheezing      Allergies    No Known Allergies    Intolerances        REVIEW OF SYSTEMS:  CONSTITUTIONAL: No fever, weight loss, or fatigue  EYES: No eye pain, visual disturbances, or discharge  ENMT:  No difficulty hearing, tinnitus, vertigo; No sinus or throat pain  NECK: No pain or stiffness  BREASTS: No pain, masses, or nipple discharge  RESPIRATORY: No cough, wheezing, chills or hemoptysis; No shortness of breath  CARDIOVASCULAR: No chest pain, palpitations, dizziness, or leg swelling  GASTROINTESTINAL: No abdominal or epigastric pain. No nausea, vomiting, or hematemesis; No diarrhea or constipation. No melena or hematochezia.  GENITOURINARY: No dysuria, frequency, hematuria, or incontinence  NEUROLOGICAL: No headaches, memory loss, loss of strength, numbness, or tremors  SKIN: No itching, burning, rashes, or lesions   LYMPH NODES: No enlarged glands  ENDOCRINE: No heat or cold intolerance; No hair loss  MUSCULOSKELETAL: No joint pain or swelling; No muscle, back, or extremity pain  PSYCHIATRIC: No depression, anxiety, mood swings, or difficulty sleeping  HEME/LYMPH: No easy bruising, or bleeding gums  ALLERY AND IMMUNOLOGIC: No hives or eczema    Vital Signs Last 24 Hrs  T(C): 36.3, Max: 36.4 ( @ 17:26)  T(F): 97.4, Max: 97.5 ( @ 17:26)  HR: 75 (64 - 82)  BP: 108/57 (100/50 - 108/57)  BP(mean): --  RR: 14 (14 - 16)  SpO2: 98% (97% - 100%)    PHYSICAL EXAM:  GENERAL: NAD, well-groomed, well-developed  HEAD:  Atraumatic, Normocephalic  EYES: EOMI, PERRLA, conjunctiva and sclera clear  ENMT: No tonsillar erythema, exudates, or enlargement; Moist mucous membranes, Good dentition, No lesions  NECK: Supple, No JVD, Normal thyroid  NERVOUS SYSTEM:  Alert & Oriented X3, Good concentration; Motor Strength 5/5 B/L upper and lower extremities; DTRs 2+ intact and symmetric  CHEST/LUNG: Clear to percussion bilaterally; No rales, rhonchi, wheezing, or rubs  HEART: Regular rate and rhythm; No murmurs, rubs, or gallops  ABDOMEN: Soft, Nontender, Nondistended; Bowel sounds present. chronic indwelling jc catheter draining clear urine  EXTREMITIES:  2+ Peripheral Pulses, No clubbing, cyanosis, or edema  LYMPH: No lymphadenopathy noted  SKIN: No rashes or lesions    LABS:                        8.5    7.6   )-----------( 206      ( 2017 06:51 )             23.9     2017 06:51    140    |  101    |  105    ----------------------------<  75     4.7     |  29     |  4.79     Ca    8.4        2017 06:51    TPro  6.6    /  Alb  2.5    /  TBili  0.4    /  DBili  x      /  AST  36     /  ALT  33     /  AlkPhos  75     2017 18:13      PT/INR - ( 2017 18:13 )   PT: 13.0 sec;   INR: 1.16 ratio         PTT - ( 2017 18:13 )  PTT:34.7 sec  Urinalysis Basic - ( 2017 23:53 )    Color: Yellow / Appearance: Clear / S.010 / pH: x  Gluc: x / Ketone: Negative  / Bili: Negative / Urobili: Negative mg/dL   Blood: x / Protein: 30 mg/dL / Nitrite: Positive   Leuk Esterase: Moderate / RBC: 11-25 /HPF / WBC 11-25   Sq Epi: x / Non Sq Epi: Occasional / Bacteria: Few      CAPILLARY BLOOD GLUCOSE                RADIOLOGY & ADDITIONAL TESTS:    Imaging Personally Reviewed:  [ ] YES  [ ] NO    Consultant(s) Notes Reviewed:  [ ] YES  [ ] NO    Care Discussed with Consultants/Other Providers [ ] YES  [ ] NO    Care discussed with family,         [  ]   yes  [  ]  No    imp:    stable[ ]    unstable[  ]     improving [  x ]       unchanged  [  ]                Plans:  Continue present plans  [  ]               New consult [  ]   specialty  ...oncology- dr andrade. , renal consult, Dr Gilbert....               order test[  ]    test name.  cbc, bmp                Discharge Planning  [  ] Patient is a 89y old  Male who presents with a chief complaint of worsening renal failure, uremia, worsening anemia, hyperkalemia, Gi bleeding (2017 21:21)      INTERVAL HPI/OVERNIGHT EVENTS:    MEDICATIONS  (STANDING):  sodium chloride 0.9%. 1000milliLiter(s) IV Continuous <Continuous>  finasteride 5milliGRAM(s) Oral daily  aspirin enteric coated 81milliGRAM(s) Oral daily  tamsulosin 0.4milliGRAM(s) Oral at bedtime  atorvastatin 40milliGRAM(s) Oral at bedtime  clopidogrel Tablet 75milliGRAM(s) Oral daily  carvedilol 3.125milliGRAM(s) Oral every 12 hours  fluticasone / salmeterol 250-50 MICROgram(s) Diskus 1Dose(s) Inhalation two times a day  ferrous    sulfate 325milliGRAM(s) Oral daily  pantoprazole    Tablet 40milliGRAM(s) Oral before breakfast  levothyroxine 50MICROGram(s) Oral daily    MEDICATIONS  (PRN):  ALBUTerol    90 MICROgram(s) HFA Inhaler 2Puff(s) Inhalation every 6 hours PRN Shortness of Breath and/or Wheezing      Allergies    No Known Allergies    Intolerances        REVIEW OF SYSTEMS:  CONSTITUTIONAL: No fever, weight loss, or fatigue  EYES: No eye pain, visual disturbances, or discharge  ENMT:  No difficulty hearing, tinnitus, vertigo; No sinus or throat pain  NECK: No pain or stiffness  BREASTS: No pain, masses, or nipple discharge  RESPIRATORY: No cough, wheezing, chills or hemoptysis; No shortness of breath  CARDIOVASCULAR: No chest pain, palpitations, dizziness, or leg swelling  GASTROINTESTINAL: No abdominal or epigastric pain. No nausea, vomiting, or hematemesis; No diarrhea or constipation. No melena or hematochezia.  GENITOURINARY: No dysuria, frequency, hematuria, or incontinence  NEUROLOGICAL: No headaches, memory loss, loss of strength, numbness, or tremors  SKIN: No itching, burning, rashes, or lesions   LYMPH NODES: No enlarged glands  ENDOCRINE: No heat or cold intolerance; No hair loss  MUSCULOSKELETAL: No joint pain or swelling; No muscle, back, or extremity pain  PSYCHIATRIC: No depression, anxiety, mood swings, or difficulty sleeping  HEME/LYMPH: No easy bruising, or bleeding gums  ALLERY AND IMMUNOLOGIC: No hives or eczema    Vital Signs Last 24 Hrs  T(C): 36.3, Max: 36.4 ( @ 17:26)  T(F): 97.4, Max: 97.5 ( @ 17:26)  HR: 75 (64 - 82)  BP: 108/57 (100/50 - 108/57)  BP(mean): --  RR: 14 (14 - 16)  SpO2: 98% (97% - 100%)    PHYSICAL EXAM:  GENERAL: NAD, well-groomed, well-developed  HEAD:  Atraumatic, Normocephalic  EYES: EOMI, PERRLA, conjunctiva and sclera clear  ENMT: No tonsillar erythema, exudates, or enlargement; Moist mucous membranes, Good dentition, No lesions  NECK: Supple, No JVD, Normal thyroid  NERVOUS SYSTEM:  Alert & Oriented X3, Good concentration; Motor Strength 5/5 B/L upper and lower extremities; DTRs 2+ intact and symmetric  CHEST/LUNG: Clear to percussion bilaterally; No rales, rhonchi, wheezing, or rubs  HEART: Regular rate and rhythm; No murmurs, rubs, or gallops  ABDOMEN: Soft, Nontender, Nondistended; Bowel sounds present. chronic indwelling jc catheter draining clear urine  EXTREMITIES:  2+ Peripheral Pulses, No clubbing, cyanosis, or edema  LYMPH: No lymphadenopathy noted  SKIN: No rashes or lesions    LABS:                        8.5    7.6   )-----------( 206      ( 2017 06:51 )             23.9     2017 06:51    140    |  101    |  105    ----------------------------<  75     4.7     |  29     |  4.79     Ca    8.4        2017 06:51    TPro  6.6    /  Alb  2.5    /  TBili  0.4    /  DBili  x      /  AST  36     /  ALT  33     /  AlkPhos  75     2017 18:13      PT/INR - ( 2017 18:13 )   PT: 13.0 sec;   INR: 1.16 ratio         PTT - ( 2017 18:13 )  PTT:34.7 sec  Urinalysis Basic - ( 2017 23:53 )    Color: Yellow / Appearance: Clear / S.010 / pH: x  Gluc: x / Ketone: Negative  / Bili: Negative / Urobili: Negative mg/dL   Blood: x / Protein: 30 mg/dL / Nitrite: Positive   Leuk Esterase: Moderate / RBC: 11-25 /HPF / WBC 11-25   Sq Epi: x / Non Sq Epi: Occasional / Bacteria: Few      CAPILLARY BLOOD GLUCOSE                RADIOLOGY & ADDITIONAL TESTS:    Imaging Personally Reviewed:  [ ] YES  [ ] NO    Consultant(s) Notes Reviewed:  [ ] YES  [ ] NO    Care Discussed with Consultants/Other Providers [ ] YES  [ ] NO    Care discussed with family,         [  ]   yes  [  ]  No    imp:    stable[ ]    unstable[  ]     improving [  x ]       unchanged  [  ]                Plans:  Continue present plans  [ x ] start patient on Levothyroxine               New consult [  ]   specialty  ...oncology- dr andrade. , renal consult, Dr Gilbert....               order test[  ]    test name.  cbc, bmp                Discharge Planning  [  ]

## 2017-02-12 NOTE — CONSULT NOTE ADULT - SUBJECTIVE AND OBJECTIVE BOX
Patient is a 89y old  Male who presents with a chief complaint of worsening renal failure, uremia, worsening anemia, hyperkalemia, Gi bleeding, high TSH.  He was advised by  PMD to report to the Emergency Department. Generalized weakness, inability to ambulate without assistance, poor PO intake. No pain, no SOB, no fever. Has chronic Salcido catheter, advanced CKD.    PAST MEDICAL & SURGICAL HISTORY:  Malignant neoplasm of colon, unspecified part of colon  CHF (congestive heart failure)  Hyperlipidemia  Cardiac anomaly  BPH (benign prostatic hypertrophy)  Hypertension  Chronic indwelling Salcido catheter  H/O colonoscopy  Stented coronary artery: s/t stent x 3    FAMILY HISTORY:  No pertinent family history in first degree relatives    No Known Allergies    MEDICATIONS  (STANDING):  sodium chloride 0.9%. 1000milliLiter(s) IV Continuous <Continuous>  finasteride 5milliGRAM(s) Oral daily  aspirin enteric coated 81milliGRAM(s) Oral daily  tamsulosin 0.4milliGRAM(s) Oral at bedtime  atorvastatin 40milliGRAM(s) Oral at bedtime  clopidogrel Tablet 75milliGRAM(s) Oral daily  carvedilol 3.125milliGRAM(s) Oral every 12 hours  fluticasone / salmeterol 250-50 MICROgram(s) Diskus 1Dose(s) Inhalation two times a day  ferrous    sulfate 325milliGRAM(s) Oral daily  pantoprazole    Tablet 40milliGRAM(s) Oral before breakfast  levothyroxine 50MICROGram(s) Oral daily    MEDICATIONS  (PRN):  ALBUTerol    90 MICROgram(s) HFA Inhaler 2Puff(s) Inhalation every 6 hours PRN Shortness of Breath and/or Wheezing    I&O's Detail  I & Os for 24h ending 2017 07:00  =============================================  IN:    sodium chloride 0.9%.: 600 ml    Total IN: 600 ml  ---------------------------------------------  OUT:    Indwelling Catheter - Urethral: 900 ml    Total OUT: 900 ml  ---------------------------------------------  Total NET: -300 ml    I & Os for current day (as of 2017 22:44)  =============================================  IN:    sodium chloride 0.9%.: 800 ml    Oral Fluid: 660 ml    Total IN: 1460 ml  ---------------------------------------------  OUT:    Indwelling Catheter - Urethral: 600 ml    Total OUT: 600 ml  ---------------------------------------------  Total NET: 860 ml    Vital Signs Last 24 Hrs  T(C): 37.1, Max: 37.1 ( @ 17:26)  T(F): 98.8, Max: 98.8 ( @ 17:26)  HR: 78 (64 - 80)  BP: 105/52 (100/50 - 108/57)  BP(mean): --  RR: 17 (14 - 17)  SpO2: 100% (97% - 100%)    PHYSICAL EXAM:  General: NAD, cachectic, supine, alert, conversant, feels cold.  No JVD.  Respiratory: b/l air entry, clear  Cardiovascular: S1 S2 regular, no rub  Gastrointestinal: soft, not tender, BS present  Extremities: no edema  No asterexis    CAPILLARY BLOOD GLUCOSE    2017 06:51    140    |  101    |  105    ----------------------------<  75     4.7     |  29     |  4.79     Ca    8.4        2017 06:51    TPro  6.6    /  Alb  2.5    /  TBili  0.4    /  DBili  x      /  AST  36     /  ALT  33     /  AlkPhos  75     2017 18:13    Hemoglobin: 8.5 g/dL ( 06:51)  Hematocrit: 23.9 % ( @ 06:51)  Potassium, Serum: 4.7 mmol/L (:51)  Blood Urea Nitrogen, Serum: 105 mg/dL ( 06:51)  Calcium, Total Serum: 8.4 mg/dL ( 06:51)  Hemoglobin: 8.4 g/dL ( 18:13)  Hematocrit: 24.2 % (:)  Potassium, Serum: 5.3 mmol/L (:13)  Blood Urea Nitrogen, Serum: 107 mg/dL (:13)  Calcium, Total Serum: 8.9 mg/dL (:13)      Creatinine, Serum: 4.79 ( 06:51)  Creatinine, Serum: 5.11 ( 18:13)    CBC Full  -  ( 2017 06:51 )  WBC Count : 7.6 K/uL  Hemoglobin : 8.5 g/dL  Hematocrit : 23.9 %  Platelet Count - Automated : 206 K/uL  Mean Cell Volume : 88.1 fl  Mean Cell Hemoglobin : 31.4 pg  Mean Cell Hemoglobin Concentration : 35.6 gm/dL  Auto Neutrophil # : x  Auto Lymphocyte # : x  Auto Monocyte # : x  Auto Eosinophil # : x  Auto Basophil # : x  Auto Neutrophil % : x  Auto Lymphocyte % : x  Auto Monocyte % : x  Auto Eosinophil % : x  Auto Basophil % : x    Urinalysis Basic - ( 2017 23:53 )    Color: Yellow / Appearance: Clear / S.010 / pH: x  Gluc: x / Ketone: Negative  / Bili: Negative / Urobili: Negative mg/dL   Blood: x / Protein: 30 mg/dL / Nitrite: Positive   Leuk Esterase: Moderate / RBC: 11-25 /HPF / WBC 11-25   Sq Epi: x / Non Sq Epi: Occasional / Bacteria: Few

## 2017-02-12 NOTE — PROGRESS NOTE ADULT - ASSESSMENT
Gi bleeding, , uremia, ckd, , chronic anemia Gi bleeding, , uremia, ckd, , chronic anemia. hypothyroidism

## 2017-02-13 LAB
ANION GAP SERPL CALC-SCNC: 10 MMOL/L — SIGNIFICANT CHANGE UP (ref 5–17)
BUN SERPL-MCNC: 97 MG/DL — HIGH (ref 7–23)
CALCIUM SERPL-MCNC: 8.1 MG/DL — LOW (ref 8.5–10.1)
CEA SERPL-MCNC: 6.1 NG/ML — HIGH (ref 0–3.8)
CHLORIDE SERPL-SCNC: 103 MMOL/L — SIGNIFICANT CHANGE UP (ref 96–108)
CO2 SERPL-SCNC: 29 MMOL/L — SIGNIFICANT CHANGE UP (ref 22–31)
CREAT SERPL-MCNC: 4.24 MG/DL — HIGH (ref 0.5–1.3)
CULTURE RESULTS: SIGNIFICANT CHANGE UP
GLUCOSE SERPL-MCNC: 76 MG/DL — SIGNIFICANT CHANGE UP (ref 70–99)
HCT VFR BLD CALC: 23.7 % — LOW (ref 39–50)
HGB BLD-MCNC: 8.3 G/DL — LOW (ref 13–17)
MCHC RBC-ENTMCNC: 30.6 PG — SIGNIFICANT CHANGE UP (ref 27–34)
MCHC RBC-ENTMCNC: 35.1 GM/DL — SIGNIFICANT CHANGE UP (ref 32–36)
MCV RBC AUTO: 87 FL — SIGNIFICANT CHANGE UP (ref 80–100)
PHOSPHATE SERPL-MCNC: 3 MG/DL — SIGNIFICANT CHANGE UP (ref 2.5–4.5)
PLATELET # BLD AUTO: 221 K/UL — SIGNIFICANT CHANGE UP (ref 150–400)
POTASSIUM SERPL-MCNC: 3.8 MMOL/L — SIGNIFICANT CHANGE UP (ref 3.5–5.3)
POTASSIUM SERPL-SCNC: 3.8 MMOL/L — SIGNIFICANT CHANGE UP (ref 3.5–5.3)
PSA FLD-MCNC: 0.42 NG/ML — SIGNIFICANT CHANGE UP (ref 0–4)
RBC # BLD: 2.72 M/UL — LOW (ref 4.2–5.8)
RBC # FLD: 13.4 % — SIGNIFICANT CHANGE UP (ref 11–15)
SODIUM SERPL-SCNC: 142 MMOL/L — SIGNIFICANT CHANGE UP (ref 135–145)
SPECIMEN SOURCE: SIGNIFICANT CHANGE UP
WBC # BLD: 6.6 K/UL — SIGNIFICANT CHANGE UP (ref 3.8–10.5)
WBC # FLD AUTO: 6.6 K/UL — SIGNIFICANT CHANGE UP (ref 3.8–10.5)

## 2017-02-13 RX ORDER — ERYTHROPOIETIN 10000 [IU]/ML
10000 INJECTION, SOLUTION INTRAVENOUS; SUBCUTANEOUS ONCE
Qty: 0 | Refills: 0 | Status: COMPLETED | OUTPATIENT
Start: 2017-02-13 | End: 2017-02-13

## 2017-02-13 RX ADMIN — ATORVASTATIN CALCIUM 40 MILLIGRAM(S): 80 TABLET, FILM COATED ORAL at 21:23

## 2017-02-13 RX ADMIN — Medication 325 MILLIGRAM(S): at 12:44

## 2017-02-13 RX ADMIN — Medication 81 MILLIGRAM(S): at 12:44

## 2017-02-13 RX ADMIN — FLUTICASONE PROPIONATE AND SALMETEROL 1 DOSE(S): 50; 250 POWDER ORAL; RESPIRATORY (INHALATION) at 17:08

## 2017-02-13 RX ADMIN — Medication 50 MICROGRAM(S): at 05:24

## 2017-02-13 RX ADMIN — CARVEDILOL PHOSPHATE 3.12 MILLIGRAM(S): 80 CAPSULE, EXTENDED RELEASE ORAL at 05:24

## 2017-02-13 RX ADMIN — CLOPIDOGREL BISULFATE 75 MILLIGRAM(S): 75 TABLET, FILM COATED ORAL at 12:44

## 2017-02-13 RX ADMIN — SODIUM CHLORIDE 80 MILLILITER(S): 9 INJECTION INTRAMUSCULAR; INTRAVENOUS; SUBCUTANEOUS at 14:15

## 2017-02-13 RX ADMIN — FINASTERIDE 5 MILLIGRAM(S): 5 TABLET, FILM COATED ORAL at 12:44

## 2017-02-13 RX ADMIN — ERYTHROPOIETIN 10000 UNIT(S): 10000 INJECTION, SOLUTION INTRAVENOUS; SUBCUTANEOUS at 14:45

## 2017-02-13 RX ADMIN — TAMSULOSIN HYDROCHLORIDE 0.4 MILLIGRAM(S): 0.4 CAPSULE ORAL at 21:23

## 2017-02-13 RX ADMIN — CARVEDILOL PHOSPHATE 3.12 MILLIGRAM(S): 80 CAPSULE, EXTENDED RELEASE ORAL at 17:08

## 2017-02-13 RX ADMIN — FLUTICASONE PROPIONATE AND SALMETEROL 1 DOSE(S): 50; 250 POWDER ORAL; RESPIRATORY (INHALATION) at 05:24

## 2017-02-13 RX ADMIN — SODIUM CHLORIDE 80 MILLILITER(S): 9 INJECTION INTRAMUSCULAR; INTRAVENOUS; SUBCUTANEOUS at 05:26

## 2017-02-13 RX ADMIN — PANTOPRAZOLE SODIUM 40 MILLIGRAM(S): 20 TABLET, DELAYED RELEASE ORAL at 08:58

## 2017-02-13 NOTE — CONSULT NOTE ADULT - PROBLEM SELECTOR RECOMMENDATION 9
-Patient with Colon cancer diagnosed one year ago, as per Patients daughter )Coral), they refused treatment with surgery because of patients age, they undersstand risks of untreated colon cancer including bleeding, perforations, patients daughter currently still refusing intervention  -consider GI Evaluation for Bleeding Lesion  -Palliative care evaluation if family does not want any intervention

## 2017-02-13 NOTE — CONSULT NOTE ADULT - ASSESSMENT
Colon cancer, anemia, heme (+) stool - The family had requested supportive care in the past. (Patient is on anti coagulants.) Left message for family to contact me. Supportive care for now.

## 2017-02-13 NOTE — PROGRESS NOTE ADULT - SUBJECTIVE AND OBJECTIVE BOX
Patient seen in follow up for ROSAURA, CKD 4; appears comfortable no distress.    MEDICATIONS  (STANDING):  sodium chloride 0.9%. 1000milliLiter(s) IV Continuous <Continuous>  finasteride 5milliGRAM(s) Oral daily  aspirin enteric coated 81milliGRAM(s) Oral daily  tamsulosin 0.4milliGRAM(s) Oral at bedtime  atorvastatin 40milliGRAM(s) Oral at bedtime  clopidogrel Tablet 75milliGRAM(s) Oral daily  carvedilol 3.125milliGRAM(s) Oral every 12 hours  fluticasone / salmeterol 250-50 MICROgram(s) Diskus 1Dose(s) Inhalation two times a day  ferrous    sulfate 325milliGRAM(s) Oral daily  pantoprazole    Tablet 40milliGRAM(s) Oral before breakfast  levothyroxine 50MICROGram(s) Oral daily    MEDICATIONS  (PRN):  ALBUTerol    90 MICROgram(s) HFA Inhaler 2Puff(s) Inhalation every 6 hours PRN Shortness of Breath and/or Wheezing    PHYSICAL EXAM:      T(C): 36.4, Max: 37.1 (02-12 @ 17:26)  HR: 68 (68 - 81)  BP: 92/51 (92/51 - 109/61)  RR: 16 (16 - 17)  SpO2: 100% (96% - 100%)  Wt(kg): --  Respiratory: clear anteriorly, decreased BS at bases  Cardiovascular: S1 S2  Gastrointestinal: soft NT ND +BS  Extremities:    1 + edema                                    8.3    6.6   )-----------( 221      ( 13 Feb 2017 06:39 )             23.7     13 Feb 2017 06:39    142    |  103    |  97     ----------------------------<  76     3.8     |  29     |  4.24     Ca    8.1        13 Feb 2017 06:39  Phos  3.0       13 Feb 2017 06:39    TPro  6.6    /  Alb  2.5    /  TBili  0.4    /  DBili  x      /  AST  36     /  ALT  33     /  AlkPhos  75     11 Feb 2017 18:13      LIVER FUNCTIONS - ( 11 Feb 2017 18:13 )  Alb: 2.5 g/dL / Pro: 6.6 gm/dL / ALK PHOS: 75 U/L / ALT: 33 U/L / AST: 36 U/L / GGT: x             Assessment and Plan:  CKD 4, stable; supportive care.

## 2017-02-13 NOTE — PROGRESS NOTE ADULT - SUBJECTIVE AND OBJECTIVE BOX
Patient is a 89y old  Male who presents with a chief complaint of worsening renal failure, uremia, worsening anemia, hyperkalemia, Gi bleeding (2017 21:21)  Hb 8.3. No active recatal bleeding. BUN trending down.    INTERVAL HPI/OVERNIGHT EVENTS: uneventful.    MEDICATIONS  (STANDING):  sodium chloride 0.9%. 1000milliLiter(s) IV Continuous <Continuous>  finasteride 5milliGRAM(s) Oral daily  aspirin enteric coated 81milliGRAM(s) Oral daily  tamsulosin 0.4milliGRAM(s) Oral at bedtime  atorvastatin 40milliGRAM(s) Oral at bedtime  clopidogrel Tablet 75milliGRAM(s) Oral daily  carvedilol 3.125milliGRAM(s) Oral every 12 hours  fluticasone / salmeterol 250-50 MICROgram(s) Diskus 1Dose(s) Inhalation two times a day  ferrous    sulfate 325milliGRAM(s) Oral daily  pantoprazole    Tablet 40milliGRAM(s) Oral before breakfast  levothyroxine 50MICROGram(s) Oral daily    MEDICATIONS  (PRN):  ALBUTerol    90 MICROgram(s) HFA Inhaler 2Puff(s) Inhalation every 6 hours PRN Shortness of Breath and/or Wheezing      Allergies    No Known Allergies    Intolerances        REVIEW OF SYSTEMS:  CONSTITUTIONAL: No fever, weight loss, or fatigue  EYES: No eye pain, visual disturbances, or discharge  ENMT:  No difficulty hearing, tinnitus, vertigo; No sinus or throat pain  NECK: No pain or stiffness  BREASTS: No pain, masses, or nipple discharge  RESPIRATORY: No cough, wheezing, chills or hemoptysis; No shortness of breath  CARDIOVASCULAR: No chest pain, palpitations, dizziness, or leg swelling  GASTROINTESTINAL: No abdominal or epigastric pain. No nausea, vomiting, or hematemesis; No diarrhea or constipation. No melena or hematochezia.  GENITOURINARY: Has chronic indwelling Jc.   NEUROLOGICAL: No headaches, memory loss, loss of strength, numbness, or tremors  SKIN: No itching, burning, rashes, or lesions   LYMPH NODES: No enlarged glands  ENDOCRINE: No heat or cold intolerance; No hair loss  MUSCULOSKELETAL: No joint pain or swelling; No muscle, back, or extremity pain  PSYCHIATRIC: No depression, anxiety, mood swings, or difficulty sleeping  HEME/LYMPH: No easy bruising, or bleeding gums  ALLERY AND IMMUNOLOGIC: No hives or eczema    Vital Signs Last 24 Hrs  T(C): 36.7, Max: 37.1 ( @ 17:26)  T(F): 98, Max: 98.8 ( @ 17:26)  HR: 76 (76 - 81)  BP: 105/51 (105/51 - 109/61)  BP(mean): --  RR: 16 (16 - 17)  SpO2: 96% (96% - 100%)    PHYSICAL EXAM:  GENERAL: NAD, well-groomed, well-developed  HEAD:  Atraumatic, Normocephalic  EYES: EOMI, PERRLA, conjunctiva and sclera clear  ENMT: No tonsillar erythema, exudates, or enlargement; Moist mucous membranes, Good dentition, No lesions  NECK: Supple, No JVD, Normal thyroid  NERVOUS SYSTEM:  Alert & Oriented X3, Good concentration; Motor Strength 5/5 B/L upper and lower extremities; DTRs 2+ intact and symmetric  CHEST/LUNG: Clear to percussion bilaterally; No rales, rhonchi, wheezing, or rubs  HEART: Regular rate and rhythm; No murmurs, rubs, or gallops  ABDOMEN: Soft, Nontender, Nondistended; Bowel sounds present Has chronic indwelling jc catheter.  EXTREMITIES:  2+ Peripheral Pulses, No clubbing, cyanosis, or edema  LYMPH: No lymphadenopathy noted  SKIN: No rashes or lesions    LABS:                        8.3    6.6   )-----------( 221      ( 2017 06:39 )             23.7     2017 06:39    142    |  103    |  97     ----------------------------<  76     3.8     |  29     |  4.24     Ca    8.1        2017 06:39  Phos  3.0       2017 06:39    TPro  6.6    /  Alb  2.5    /  TBili  0.4    /  DBili  x      /  AST  36     /  ALT  33     /  AlkPhos  75     2017 18:13      PT/INR - ( 2017 18:13 )   PT: 13.0 sec;   INR: 1.16 ratio         PTT - ( 2017 18:13 )  PTT:34.7 sec  Urinalysis Basic - ( 2017 23:53 )    Color: Yellow / Appearance: Clear / S.010 / pH: x  Gluc: x / Ketone: Negative  / Bili: Negative / Urobili: Negative mg/dL   Blood: x / Protein: 30 mg/dL / Nitrite: Positive   Leuk Esterase: Moderate / RBC: 11-25 /HPF / WBC 11-25   Sq Epi: x / Non Sq Epi: Occasional / Bacteria: Few      CAPILLARY BLOOD GLUCOSE                RADIOLOGY & ADDITIONAL TESTS:    Imaging Personally Reviewed:  [ ] YES  [ ] NO    Consultant(s) Notes Reviewed:  [ ] YES  [ ] NO    Care Discussed with Consultants/Other Providers [ ] YES  [ ] NO    Care discussed with family,         [  ]   yes  [  ]  No    imp:    stable[ ]    unstable[  ]     improving [ x  ]       unchanged  [  ]                Plans:  Continue present plans  [ x ]               New consult [ x ]   specialty  .......GI. Dr Hyman.               order test[ x ]    test name.  cbc, BMP, cea                Discharge Planning  [  ]

## 2017-02-13 NOTE — CONSULT NOTE ADULT - SUBJECTIVE AND OBJECTIVE BOX
Reason for Consultation: ? History of Colon Cancer    HPI: Patient is a 89y Male seen on consultatioin for the evaluation and management of colon cancer, patient denies knowing about any colon cancer, patient was admitted Acute Renal Failure, Hyperkalemia, elevated TSH, patient was diagnosed with colon cancer around a year ago the patient refused surgery, and still has colon cancer in-situ, d/w patients daughter Coral    PAST MEDICAL & SURGICAL HISTORY:  Malignant neoplasm of colon, unspecified part of colon  CHF (congestive heart failure)  Hyperlipidemia  Cardiac anomaly  BPH (benign prostatic hypertrophy)  Hypertension  Chronic indwelling Salcido catheter  H/O colonoscopy  Stented coronary artery: s/t stent x 3      REVIEW OF SYSTEMS:    weakness +    MEDICATIONS  (STANDING):  sodium chloride 0.9%. 1000milliLiter(s) IV Continuous <Continuous>  finasteride 5milliGRAM(s) Oral daily  aspirin enteric coated 81milliGRAM(s) Oral daily  tamsulosin 0.4milliGRAM(s) Oral at bedtime  atorvastatin 40milliGRAM(s) Oral at bedtime  clopidogrel Tablet 75milliGRAM(s) Oral daily  carvedilol 3.125milliGRAM(s) Oral every 12 hours  fluticasone / salmeterol 250-50 MICROgram(s) Diskus 1Dose(s) Inhalation two times a day  ferrous    sulfate 325milliGRAM(s) Oral daily  pantoprazole    Tablet 40milliGRAM(s) Oral before breakfast  levothyroxine 50MICROGram(s) Oral daily    MEDICATIONS  (PRN):  ALBUTerol    90 MICROgram(s) HFA Inhaler 2Puff(s) Inhalation every 6 hours PRN Shortness of Breath and/or Wheezing      Allergies    No Known Allergies    Intolerances        SOCIAL HISTORY:    Smoking Status: no  Alcohol: no  Marital Status:   Occupation: retired    FAMILY HISTORY:  No pertinent family history in first degree relatives          Vital Signs Last 24 Hrs  T(C): 36.7, Max: 37.1 (02-12 @ 17:26)  T(F): 98, Max: 98.8 (02-12 @ 17:26)  HR: 76 (75 - 81)  BP: 105/51 (105/51 - 109/61)  BP(mean): --  RR: 16 (14 - 17)  SpO2: 96% (96% - 100%)    PHYSICAL EXAM:    general - AAO x 3  HEENT - No Icterus  CVS - RRR  RS - AE B/L  Abd - soft, NT  Ext - Pulses +        LABS:                        8.3    6.6   )-----------( 221      ( 2017 06:39 )             23.7     2017 06:39    142    |  103    |  97     ----------------------------<  76     3.8     |  29     |  4.24     Ca    8.1        2017 06:39  Phos  3.0       2017 06:39    TPro  6.6    /  Alb  2.5    /  TBili  0.4    /  DBili  x      /  AST  36     /  ALT  33     /  AlkPhos  75     2017 18:13    PT/INR - ( 2017 18:13 )   PT: 13.0 sec;   INR: 1.16 ratio         PTT - ( 2017 18:13 )  PTT:34.7 sec  Urinalysis Basic - ( 2017 23:53 )    Color: Yellow / Appearance: Clear / S.010 / pH: x  Gluc: x / Ketone: Negative  / Bili: Negative / Urobili: Negative mg/dL   Blood: x / Protein: 30 mg/dL / Nitrite: Positive   Leuk Esterase: Moderate / RBC: 11-25 /HPF / WBC 11-25   Sq Epi: x / Non Sq Epi: Occasional / Bacteria: Few        RADIOLOGY & ADDITIONAL STUDIES:

## 2017-02-13 NOTE — CONSULT NOTE ADULT - SUBJECTIVE AND OBJECTIVE BOX
HPI:  89yo male from home with his daughter and HHA who states they were called by the PMD and instructed to report to the Emergency Department.  After discussion w/ PMD it was noted that the patient had anemia, worsening renal failure, and elevated TSH to 100.  Pt has been noted by the family to have generalized weakness, inability to ambulate without assistance poor PO intake (2017 21:21)  Consult called for GI bleed. See Heme/onc note. Patient is a poor historian.  The patient denies melena, hematochezia, hematemesis, nausea, vomiting, abdominal pain, constipation, diarrhea, colon cancer, or change in bowel movements.  Patient has a normocytic anemia.     PAST MEDICAL & SURGICAL HISTORY:  Malignant neoplasm of colon, unspecified part of colon  CHF (congestive heart failure)  Hyperlipidemia  Cardiac anomaly  BPH (benign prostatic hypertrophy)  Hypertension  Chronic indwelling Salcido catheter  H/O colonoscopy  Stented coronary artery: s/t stent x 3      MEDICATIONS  (STANDING):  sodium chloride 0.9%. 1000milliLiter(s) IV Continuous <Continuous>  finasteride 5milliGRAM(s) Oral daily  aspirin enteric coated 81milliGRAM(s) Oral daily  tamsulosin 0.4milliGRAM(s) Oral at bedtime  atorvastatin 40milliGRAM(s) Oral at bedtime  clopidogrel Tablet 75milliGRAM(s) Oral daily  carvedilol 3.125milliGRAM(s) Oral every 12 hours  fluticasone / salmeterol 250-50 MICROgram(s) Diskus 1Dose(s) Inhalation two times a day  ferrous    sulfate 325milliGRAM(s) Oral daily  pantoprazole    Tablet 40milliGRAM(s) Oral before breakfast  levothyroxine 50MICROGram(s) Oral daily    MEDICATIONS  (PRN):  ALBUTerol    90 MICROgram(s) HFA Inhaler 2Puff(s) Inhalation every 6 hours PRN Shortness of Breath and/or Wheezing      Allergies    No Known Allergies    Intolerances        FAMILY HISTORY:  No pertinent family history in first degree relatives      REVIEW OF SYSTEMS:    CONSTITUTIONAL: No fever, weight loss, or fatigue  EYES: No eye pain, visual disturbances, or discharge  ENMT:  No difficulty hearing, tinnitus, vertigo; No sinus or throat pain  NECK: No pain or stiffness  BREASTS: No pain, masses, or nipple discharge  RESPIRATORY: No cough, wheezing, chills or hemoptysis; No shortness of breath  CARDIOVASCULAR: No chest pain, palpitations, dizziness, or leg swelling  GASTROINTESTINAL: No abdominal or epigastric pain. No nausea, vomiting, or hematemesis; No diarrhea or constipation. No melena or hematochezia.  GENITOURINARY: No dysuria, frequency, hematuria, or incontinence  NEUROLOGICAL: No headaches, memory loss, loss of strength, numbness, or tremors  SKIN: No itching, burning, rashes, or lesions   LYMPH NODES: No enlarged glands  ENDOCRINE: No heat or cold intolerance; No hair loss  MUSCULOSKELETAL: No joint pain or swelling; No muscle, back, or extremity pain  PSYCHIATRIC: No depression, anxiety, mood swings, or difficulty sleeping  HEME/LYMPH: No easy bruising, or bleeding gums  ALLERGY AND IMMUNOLOGIC: No hives or eczema          SOCIAL HISTORY:    FAMILY HISTORY:  No pertinent family history in first degree relatives      Vital Signs Last 24 Hrs  T(C): 36.4, Max: 37.1 (12 @ 17:26)  T(F): 97.6, Max: 98.8 ( @ 17:26)  HR: 68 (68 - 81)  BP: 92/51 (92/51 - 109/61)  BP(mean): --  RR: 16 (16 - 17)  SpO2: 100% (96% - 100%)    PHYSICAL EXAM:    GENERAL: NAD, well-groomed, well-developed  HEAD:  Atraumatic, Normocephalic  EYES: EOMI, PERRLA, conjunctiva and sclera clear  ENMT: No tonsillar erythema, exudates, or enlargement; Moist mucous membranes, Good dentition, No lesions  NECK: Supple, No JVD, Normal thyroid  NERVOUS SYSTEM:  Alert & Oriented X3, Good concentration; Motor Strength 5/5 B/L upper and lower extremities; DTRs 2+ intact and symmetric  CHEST/LUNG: Clear to percussion bilaterally; No rales, rhonchi, wheezing, or rubs  HEART: Regular rate and rhythm; No murmurs, rubs, or gallops  ABDOMEN: Soft, Nontender, Nondistended; Bowel sounds present  EXTREMITIES:  2+ Peripheral Pulses, No clubbing, cyanosis, or edema  LYMPH: No lymphadenopathy noted   RECTAL: No masses, prostate normal size and smooth, Guaiaci negative   BREAST: No palpable masses, skin no lesions no retractions, no discharges. adnexal no palpable masses noted   GYN: uterus normal size, adnexal, no palpable masses, no CMT, no uterine discharge   SKIN: No rashes or lesions    LABS:                        8.3    6.6   )-----------( 221      ( 2017 06:39 )             23.7     2017 06:39    142    |  103    |  97     ----------------------------<  76     3.8     |  29     |  4.24   2017 06:51    140    |  101    |  105    ----------------------------<  75     4.7     |  29     |  4.79   2017 18:13    138    |  97     |  107    ----------------------------<  86     5.3     |  32     |  5.11     Ca    8.1        2017 06:39  Ca    8.4        2017 06:51  Ca    8.9        2017 18:13  Phos  3.0       2017 06:39    TPro  6.6    /  Alb  2.5    /  TBili  0.4    /  DBili  x      /  AST  36     /  ALT  33     /  AlkPhos  75     2017 18:13    PT/INR - ( 2017 18:13 )   PT: 13.0 sec;   INR: 1.16 ratio         PTT - ( 2017 18:13 )  PTT:34.7 sec  Urinalysis Basic - ( 2017 23:53 )    Color: Yellow / Appearance: Clear / S.010 / pH: x  Gluc: x / Ketone: Negative  / Bili: Negative / Urobili: Negative mg/dL   Blood: x / Protein: 30 mg/dL / Nitrite: Positive   Leuk Esterase: Moderate / RBC: 11-25 /HPF / WBC 11-25   Sq Epi: x / Non Sq Epi: Occasional / Bacteria: Few          RADIOLOGY & ADDITIONAL STUDIES: HPI:  89yo male from home with his daughter and HHA who states they were called by the PMD and instructed to report to the Emergency Department.  After discussion w/ PMD it was noted that the patient had anemia, worsening renal failure, and elevated TSH to 100.  Pt has been noted by the family to have generalized weakness, inability to ambulate without assistance poor PO intake (2017 21:21)  Consult called for GI bleed. See Heme/onc note. Patient is a poor historian.  The patient denies melena, hematochezia, hematemesis, nausea, vomiting, abdominal pain, constipation, diarrhea, colon cancer, or change in bowel movements.  Patient has a normocytic anemia. CEA 6.1    PAST MEDICAL & SURGICAL HISTORY:  Malignant neoplasm of colon, unspecified part of colon  CHF (congestive heart failure)  Hyperlipidemia  Cardiac anomaly  BPH (benign prostatic hypertrophy)  Hypertension  Chronic indwelling Salcido catheter  H/O colonoscopy  Stented coronary artery: s/t stent x 3      MEDICATIONS  (STANDING):  sodium chloride 0.9%. 1000milliLiter(s) IV Continuous <Continuous>  finasteride 5milliGRAM(s) Oral daily  aspirin enteric coated 81milliGRAM(s) Oral daily  tamsulosin 0.4milliGRAM(s) Oral at bedtime  atorvastatin 40milliGRAM(s) Oral at bedtime  clopidogrel Tablet 75milliGRAM(s) Oral daily  carvedilol 3.125milliGRAM(s) Oral every 12 hours  fluticasone / salmeterol 250-50 MICROgram(s) Diskus 1Dose(s) Inhalation two times a day  ferrous    sulfate 325milliGRAM(s) Oral daily  pantoprazole    Tablet 40milliGRAM(s) Oral before breakfast  levothyroxine 50MICROGram(s) Oral daily    MEDICATIONS  (PRN):  ALBUTerol    90 MICROgram(s) HFA Inhaler 2Puff(s) Inhalation every 6 hours PRN Shortness of Breath and/or Wheezing      Allergies    No Known Allergies    Intolerances        FAMILY HISTORY:  No pertinent family history in first degree relatives      REVIEW OF SYSTEMS:    CONSTITUTIONAL: No fever, weight loss, or fatigue  EYES: No eye pain, visual disturbances, or discharge  ENMT:  No difficulty hearing, tinnitus, vertigo; No sinus or throat pain  NECK: No pain or stiffness  BREASTS: No pain, masses, or nipple discharge  RESPIRATORY: No cough, wheezing, chills or hemoptysis; No shortness of breath  CARDIOVASCULAR: No chest pain, palpitations, dizziness, or leg swelling  GASTROINTESTINAL: No abdominal or epigastric pain. No nausea, vomiting, or hematemesis; No diarrhea or constipation. No melena or hematochezia.  GENITOURINARY: No dysuria, frequency, hematuria, or incontinence  NEUROLOGICAL: No headaches, memory loss, loss of strength, numbness, or tremors  SKIN: No itching, burning, rashes, or lesions   LYMPH NODES: No enlarged glands  ENDOCRINE: No heat or cold intolerance; No hair loss  MUSCULOSKELETAL: No joint pain or swelling; No muscle, back, or extremity pain  PSYCHIATRIC: No depression, anxiety, mood swings, or difficulty sleeping  HEME/LYMPH: No easy bruising, or bleeding gums  ALLERGY AND IMMUNOLOGIC: No hives or eczema          SOCIAL HISTORY:    FAMILY HISTORY:  No pertinent family history in first degree relatives      Vital Signs Last 24 Hrs  T(C): 36.4, Max: 37.1 (-12 @ 17:26)  T(F): 97.6, Max: 98.8 ( @ 17:26)  HR: 68 (68 - 81)  BP: 92/51 (92/51 - 109/61)  BP(mean): --  RR: 16 (16 - 17)  SpO2: 100% (96% - 100%)    PHYSICAL EXAM:    GENERAL: NAD, well-groomed, well-developed  HEAD:  Atraumatic, Normocephalic  EYES: EOMI, PERRLA, conjunctiva and sclera clear  ENMT: No tonsillar erythema, exudates, or enlargement; Moist mucous membranes, Good dentition, No lesions  NECK: Supple, No JVD, Normal thyroid  NERVOUS SYSTEM:  Alert & Oriented X3, Good concentration; Motor Strength 5/5 B/L upper and lower extremities; DTRs 2+ intact and symmetric  CHEST/LUNG: Clear to percussion bilaterally; No rales, rhonchi, wheezing, or rubs  HEART: Regular rate and rhythm; No murmurs, rubs, or gallops  ABDOMEN: Soft, Nontender, Nondistended; Bowel sounds present  EXTREMITIES:  2+ Peripheral Pulses, No clubbing, cyanosis, or edema  LYMPH: No lymphadenopathy noted   RECTAL: No masses, prostate normal size and smooth, Guaiaci negative   BREAST: No palpable masses, skin no lesions no retractions, no discharges. adnexal no palpable masses noted   GYN: uterus normal size, adnexal, no palpable masses, no CMT, no uterine discharge   SKIN: No rashes or lesions    LABS:                        8.3    6.6   )-----------( 221      ( 2017 06:39 )             23.7     2017 06:39    142    |  103    |  97     ----------------------------<  76     3.8     |  29     |  4.24   2017 06:51    140    |  101    |  105    ----------------------------<  75     4.7     |  29     |  4.79   2017 18:13    138    |  97     |  107    ----------------------------<  86     5.3     |  32     |  5.11     Ca    8.1        2017 06:39  Ca    8.4        2017 06:51  Ca    8.9        2017 18:13  Phos  3.0       2017 06:39    TPro  6.6    /  Alb  2.5    /  TBili  0.4    /  DBili  x      /  AST  36     /  ALT  33     /  AlkPhos  75     2017 18:13    PT/INR - ( 2017 18:13 )   PT: 13.0 sec;   INR: 1.16 ratio         PTT - ( 2017 18:13 )  PTT:34.7 sec  Urinalysis Basic - ( 2017 23:53 )    Color: Yellow / Appearance: Clear / S.010 / pH: x  Gluc: x / Ketone: Negative  / Bili: Negative / Urobili: Negative mg/dL   Blood: x / Protein: 30 mg/dL / Nitrite: Positive   Leuk Esterase: Moderate / RBC: 11-25 /HPF / WBC 11-25   Sq Epi: x / Non Sq Epi: Occasional / Bacteria: Few          RADIOLOGY & ADDITIONAL STUDIES:

## 2017-02-14 VITALS
RESPIRATION RATE: 17 BRPM | OXYGEN SATURATION: 94 % | SYSTOLIC BLOOD PRESSURE: 94 MMHG | HEART RATE: 72 BPM | TEMPERATURE: 98 F | DIASTOLIC BLOOD PRESSURE: 50 MMHG

## 2017-02-14 LAB
ANION GAP SERPL CALC-SCNC: 7 MMOL/L — SIGNIFICANT CHANGE UP (ref 5–17)
BUN SERPL-MCNC: 76 MG/DL — HIGH (ref 7–23)
CALCIUM SERPL-MCNC: 8.2 MG/DL — LOW (ref 8.5–10.1)
CHLORIDE SERPL-SCNC: 105 MMOL/L — SIGNIFICANT CHANGE UP (ref 96–108)
CO2 SERPL-SCNC: 31 MMOL/L — SIGNIFICANT CHANGE UP (ref 22–31)
CREAT SERPL-MCNC: 3.69 MG/DL — HIGH (ref 0.5–1.3)
FERRITIN SERPL-MCNC: 316.6 NG/ML — SIGNIFICANT CHANGE UP (ref 30–400)
GLUCOSE SERPL-MCNC: 76 MG/DL — SIGNIFICANT CHANGE UP (ref 70–99)
HCT VFR BLD CALC: 23.9 % — LOW (ref 39–50)
HGB BLD-MCNC: 8.3 G/DL — LOW (ref 13–17)
IRON SATN MFR SERPL: 34 % — SIGNIFICANT CHANGE UP (ref 16–55)
IRON SATN MFR SERPL: 52 UG/DL — SIGNIFICANT CHANGE UP (ref 45–165)
MCHC RBC-ENTMCNC: 30.8 PG — SIGNIFICANT CHANGE UP (ref 27–34)
MCHC RBC-ENTMCNC: 34.6 GM/DL — SIGNIFICANT CHANGE UP (ref 32–36)
MCV RBC AUTO: 89 FL — SIGNIFICANT CHANGE UP (ref 80–100)
PLATELET # BLD AUTO: 200 K/UL — SIGNIFICANT CHANGE UP (ref 150–400)
POTASSIUM SERPL-MCNC: 4 MMOL/L — SIGNIFICANT CHANGE UP (ref 3.5–5.3)
POTASSIUM SERPL-SCNC: 4 MMOL/L — SIGNIFICANT CHANGE UP (ref 3.5–5.3)
RBC # BLD: 2.69 M/UL — LOW (ref 4.2–5.8)
RBC # FLD: 13.9 % — SIGNIFICANT CHANGE UP (ref 11–15)
SODIUM SERPL-SCNC: 143 MMOL/L — SIGNIFICANT CHANGE UP (ref 135–145)
TIBC SERPL-MCNC: 151 UG/DL — LOW (ref 220–430)
UIBC SERPL-MCNC: 99 UG/DL — LOW (ref 110–370)
WBC # BLD: 5.8 K/UL — SIGNIFICANT CHANGE UP (ref 3.8–10.5)
WBC # FLD AUTO: 5.8 K/UL — SIGNIFICANT CHANGE UP (ref 3.8–10.5)

## 2017-02-14 RX ORDER — LEVOTHYROXINE SODIUM 125 MCG
1 TABLET ORAL
Qty: 90 | Refills: 0 | OUTPATIENT
Start: 2017-02-14 | End: 2017-05-15

## 2017-02-14 RX ADMIN — PANTOPRAZOLE SODIUM 40 MILLIGRAM(S): 20 TABLET, DELAYED RELEASE ORAL at 07:37

## 2017-02-14 RX ADMIN — FINASTERIDE 5 MILLIGRAM(S): 5 TABLET, FILM COATED ORAL at 11:52

## 2017-02-14 RX ADMIN — Medication 325 MILLIGRAM(S): at 11:53

## 2017-02-14 RX ADMIN — CLOPIDOGREL BISULFATE 75 MILLIGRAM(S): 75 TABLET, FILM COATED ORAL at 11:53

## 2017-02-14 RX ADMIN — Medication 81 MILLIGRAM(S): at 11:53

## 2017-02-14 RX ADMIN — CARVEDILOL PHOSPHATE 3.12 MILLIGRAM(S): 80 CAPSULE, EXTENDED RELEASE ORAL at 05:23

## 2017-02-14 RX ADMIN — FLUTICASONE PROPIONATE AND SALMETEROL 1 DOSE(S): 50; 250 POWDER ORAL; RESPIRATORY (INHALATION) at 05:23

## 2017-02-14 RX ADMIN — Medication 50 MICROGRAM(S): at 05:23

## 2017-02-14 NOTE — PROGRESS NOTE ADULT - SUBJECTIVE AND OBJECTIVE BOX
Patient states he feels well    Vital Signs Last 24 Hrs  T(C): 36.6, Max: 36.7 (02-14 @ 00:01)  T(F): 97.8, Max: 98 (02-14 @ 00:01)  HR: 77 (68 - 77)  BP: 105/55 (92/51 - 119/73)  BP(mean): --  RR: 17 (16 - 17)  SpO2: 100% (100% - 100%)    PHYSICAL EXAM:    general - AAO x 3  HEENT - No Icterus  CVS - RRR  RS - AE B/L  Abd - soft, NT  Ext - Pulses +        LABS:                        8.3    5.8   )-----------( 200      ( 14 Feb 2017 06:57 )             23.9     14 Feb 2017 06:57    143    |  105    |  76     ----------------------------<  76     4.0     |  31     |  3.69     Ca    8.2        14 Feb 2017 06:57  Phos  3.0       13 Feb 2017 06:39            RADIOLOGY & ADDITIONAL STUDIES:

## 2017-02-14 NOTE — PROGRESS NOTE ADULT - SUBJECTIVE AND OBJECTIVE BOX
Patient is a 89y old  Male who presents with a chief complaint of worsening renal failure, uremia, worsening anemia, hyperkalemia, Gi bleeding (11 Feb 2017 21:21)      HPI:  89yo male from home with his daughter and HHA who states they were called by the PMD and instructed to report to the Emergency Department.  After discussion w/ PMD it was noted that the patient had anemia, worsening renal failure, and elevated TSH to 100.  Pt has been noted by the family to have generalized weakness, inability to ambulate without assistance poor PO intake (11 Feb 2017 21:21)      INTERVAL HPI/OVERNIGHT EVENTS:  The patient was seen / examined earlier today.  The patient denies melena, hematochezia, hematemesis, nausea, vomiting, abdominal pain, constipation, diarrhea, or change in bowel movements       MEDICATIONS  (STANDING):  sodium chloride 0.9%. 1000milliLiter(s) IV Continuous <Continuous>  finasteride 5milliGRAM(s) Oral daily  aspirin enteric coated 81milliGRAM(s) Oral daily  tamsulosin 0.4milliGRAM(s) Oral at bedtime  atorvastatin 40milliGRAM(s) Oral at bedtime  clopidogrel Tablet 75milliGRAM(s) Oral daily  carvedilol 3.125milliGRAM(s) Oral every 12 hours  fluticasone / salmeterol 250-50 MICROgram(s) Diskus 1Dose(s) Inhalation two times a day  ferrous    sulfate 325milliGRAM(s) Oral daily  pantoprazole    Tablet 40milliGRAM(s) Oral before breakfast  levothyroxine 50MICROGram(s) Oral daily    MEDICATIONS  (PRN):  ALBUTerol    90 MICROgram(s) HFA Inhaler 2Puff(s) Inhalation every 6 hours PRN Shortness of Breath and/or Wheezing      FAMILY HISTORY:  No pertinent family history in first degree relatives      Allergies    No Known Allergies    Intolerances        PMH/PSH:  Malignant neoplasm of colon, unspecified part of colon  CHF (congestive heart failure)  Hyperlipidemia  Cardiac anomaly  BPH (benign prostatic hypertrophy)  Hypertension  Chronic indwelling Salcido catheter  H/O colonoscopy  Stented coronary artery        REVIEW OF SYSTEMS:  CONSTITUTIONAL: No fever, weight loss, or fatigue  EYES: No eye pain, visual disturbances, or discharge  NECK: No pain or stiffness  BREASTS: No pain, masses, or nipple discharge  RESPIRATORY: No cough, wheezing, chills or hemoptysis; No shortness of breath  CARDIOVASCULAR: No chest pain, palpitations, dizziness, or leg swelling  GASTROINTESTINAL: No abdominal or epigastric pain. No nausea, vomiting, or hematemesis; No diarrhea or constipation. No melena or hematochezia.  GENITOURINARY: No dysuria, frequency, hematuria, or incontinence  NEUROLOGICAL: No headaches, memory loss, loss of strength, numbness, or tremors  SKIN: No itching, burning, rashes, or lesions   LYMPH NODES: No enlarged glands    Vital Signs Last 24 Hrs  T(C): 36.7, Max: 36.7 (02-14 @ 00:01)  T(F): 98.1, Max: 98.1 (02-14 @ 11:26)  HR: 72 (72 - 77)  BP: 94/50 (94/50 - 119/73)  BP(mean): --  RR: 17 (17 - 17)  SpO2: 94% (94% - 100%)    PHYSICAL EXAM:  GENERAL: NAD, well-groomed, well-developed  HEAD:  Atraumatic, Normocephalic  EYES: EOMI, PERRLA, conjunctiva and sclera clear  NECK: Supple, No JVD, Normal thyroid  NERVOUS SYSTEM:  Alert & Oriented X3, Good concentration; Motor Strength 5/5 B/L upper and lower extremities; DTRs 2+ intact and symmetric  CHEST/LUNG: Clear to percussion bilaterally; No rales, rhonchi, wheezing, or rubs  HEART: Regular rate and rhythm; No murmurs, rubs, or gallops  ABDOMEN: Soft, Nontender, Nondistended; Bowel sounds present  EXTREMITIES:  2+ Peripheral Pulses, No clubbing, cyanosis, or edema  LYMPH: No lymphadenopathy noted  SKIN: No rashes or lesions    LABS:    02-14 @ 06:57   HGB 23.9  HCT 8.3  MCV 89.0   RDW 13.9 WBC 5.8 PLTA 200        02-13 @ 06:39   HGB 23.7  HCT 8.3  MCV 87.0   RDW 13.4 WBC 6.6 PLTA 221        02-12 @ 06:51   HGB 23.9  HCT 8.5  MCV 88.1   RDW 14.1 WBC 7.6 PLTA 206        02-11 @ 18:13   HGB 24.2  HCT 8.4  MCV 86.5   RDW 13.8 WBC 8.3 PLTA 219          14 Feb 2017 06:57    143    |  105    |  76     ----------------------------<  76     4.0     |  31     |  3.69   13 Feb 2017 06:39    142    |  103    |  97     ----------------------------<  76     3.8     |  29     |  4.24   12 Feb 2017 06:51    140    |  101    |  105    ----------------------------<  75     4.7     |  29     |  4.79   11 Feb 2017 18:13    138    |  97     |  107    ----------------------------<  86     5.3     |  32     |  5.11     Ca    8.2        14 Feb 2017 06:57  Ca    8.1        13 Feb 2017 06:39  Ca    8.4        12 Feb 2017 06:51  Ca    8.9        11 Feb 2017 18:13  Phos  3.0       13 Feb 2017 06:39    TPro  6.6    /  Alb  2.5    /  TBili  0.4    /  DBili  x      /  AST  36     /  ALT  33     /  AlkPhos  75     11 Feb 2017 18:13        CAPILLARY BLOOD GLUCOSE        RADIOLOGY & ADDITIONAL TESTS:    Imaging Personally Reviewed:  [ ] YES  [ ] NO    Consultant(s) Notes Reviewed:  [ ] YES  [ ] NO    Care Discussed with Consultants/Other Providers [ ] YES  [ ] NO

## 2017-02-14 NOTE — PROGRESS NOTE ADULT - PROBLEM SELECTOR PLAN 1
Patient and Family to decide if they want to pursue aggressive treatment for colon cancer vs supportive care  continue supportive care  -palliative care evaluation   -physical therapy

## 2017-02-14 NOTE — PROGRESS NOTE ADULT - SUBJECTIVE AND OBJECTIVE BOX
Patient is a 89y old  Male who presents with a chief complaint of worsening renal failure, uremia, worsening anemia, hyperkalemia, Gi bleeding (11 Feb 2017 21:21)  stool is positive for occult blood, No BRBPR. HB is stable. He also had CKD stage 5 and colonic lesion.    INTERVAL HPI/OVERNIGHT EVENTS: uneventful. HB/HCT stable    MEDICATIONS  (STANDING):  sodium chloride 0.9%. 1000milliLiter(s) IV Continuous <Continuous>  finasteride 5milliGRAM(s) Oral daily  aspirin enteric coated 81milliGRAM(s) Oral daily  tamsulosin 0.4milliGRAM(s) Oral at bedtime  atorvastatin 40milliGRAM(s) Oral at bedtime  clopidogrel Tablet 75milliGRAM(s) Oral daily  carvedilol 3.125milliGRAM(s) Oral every 12 hours  fluticasone / salmeterol 250-50 MICROgram(s) Diskus 1Dose(s) Inhalation two times a day  ferrous    sulfate 325milliGRAM(s) Oral daily  pantoprazole    Tablet 40milliGRAM(s) Oral before breakfast  levothyroxine 50MICROGram(s) Oral daily    MEDICATIONS  (PRN):  ALBUTerol    90 MICROgram(s) HFA Inhaler 2Puff(s) Inhalation every 6 hours PRN Shortness of Breath and/or Wheezing      Allergies    No Known Allergies    Intolerances        REVIEW OF SYSTEMS:  CONSTITUTIONAL: No fever, weight loss, or fatigue  EYES: No eye pain, visual disturbances, or discharge  ENMT:  No difficulty hearing, tinnitus, vertigo; No sinus or throat pain  NECK: No pain or stiffness  BREASTS: No pain, masses, or nipple discharge  RESPIRATORY: No cough, wheezing, chills or hemoptysis; No shortness of breath  CARDIOVASCULAR: No chest pain, palpitations, dizziness, or leg swelling  GASTROINTESTINAL: No abdominal or epigastric pain. No nausea, vomiting, or hematemesis; No diarrhea or constipation. No melena or hematochezia.  GENITOURINARY: has chronic indwelling jc catheter  NEUROLOGICAL: No headaches, memory loss, loss of strength, numbness, or tremors  SKIN: No itching, burning, rashes, or lesions   LYMPH NODES: No enlarged glands  ENDOCRINE: No heat or cold intolerance; No hair loss  MUSCULOSKELETAL: No joint pain or swelling; No muscle, back, or extremity pain  PSYCHIATRIC: No depression, anxiety, mood swings, or difficulty sleeping  HEME/LYMPH: No easy bruising, or bleeding gums  ALLERY AND IMMUNOLOGIC: No hives or eczema    Vital Signs Last 24 Hrs  T(C): 36.6, Max: 36.7 (02-14 @ 00:01)  T(F): 97.8, Max: 98 (02-14 @ 00:01)  HR: 77 (68 - 77)  BP: 105/55 (92/51 - 119/73)  BP(mean): --  RR: 17 (16 - 17)  SpO2: 100% (100% - 100%)    PHYSICAL EXAM:  GENERAL: NAD, well-groomed, well-developed  HEAD:  Atraumatic, Normocephalic  EYES: EOMI, PERRLA, conjunctiva and sclera clear  ENMT: No tonsillar erythema, exudates, or enlargement; Moist mucous membranes, Good dentition, No lesions  NECK: Supple, No JVD, Normal thyroid  NERVOUS SYSTEM:  Alert & Oriented X3, Good concentration; Motor Strength 5/5 B/L upper and lower extremities; DTRs 2+ intact and symmetric  CHEST/LUNG: Clear to percussion bilaterally; No rales, rhonchi, wheezing, or rubs  HEART: Regular rate and rhythm; No murmurs, rubs, or gallops  ABDOMEN: Soft, Nontender, Nondistended; Bowel sounds present  EXTREMITIES:  2+ Peripheral Pulses, No clubbing, cyanosis, or edema  LYMPH: No lymphadenopathy noted  SKIN: No rashes or lesions    LABS:                        8.3    5.8   )-----------( 200      ( 14 Feb 2017 06:57 )             23.9     14 Feb 2017 06:57    143    |  105    |  76     ----------------------------<  76     4.0     |  31     |  3.69     Ca    8.2        14 Feb 2017 06:57  Phos  3.0       13 Feb 2017 06:39            CAPILLARY BLOOD GLUCOSE                RADIOLOGY & ADDITIONAL TESTS:    Imaging Personally Reviewed:  [ ] YES  [ ] NO    Consultant(s) Notes Reviewed:  [ ] YES  [ ] NO    Care Discussed with Consultants/Other Providers [x] YES  [ ] NO    Care discussed with family,         [ x]   yes  [  ]  No    imp:    stable[ ]    unstable[  ]     improving [ x  ]       unchanged  [  ]                Plans:  Continue present plans  [ x ] dischrge patient for Op follow up               New consult [  ]   specialty  .......               order test[  ]    test name.                  Discharge Planning  [  ]

## 2017-02-14 NOTE — PROGRESS NOTE ADULT - ASSESSMENT
Colon cancer, anemia, heme (+) stool - The family had requested supportive care in the past. (Patient is on anti coagulants.) Spoke with daughter 1) The patient had a colonoscopy that localized the cancer. 2)  patient/family is aware of diagnosis 3) The daughter states that she/patient is interested in chemotherapy. ....The daughter knows to see/speak to Dr Koroma regarding follow up. She also knows to see his gastroenterologist for follow up or questions. The daughter knows to call me during the interim if she has any questions. The patient is stable from GI point of view for discharge.

## 2017-02-19 DIAGNOSIS — Z79.82 LONG TERM (CURRENT) USE OF ASPIRIN: ICD-10-CM

## 2017-02-19 DIAGNOSIS — N18.5 CHRONIC KIDNEY DISEASE, STAGE 5: ICD-10-CM

## 2017-02-19 DIAGNOSIS — K92.2 GASTROINTESTINAL HEMORRHAGE, UNSPECIFIED: ICD-10-CM

## 2017-02-19 DIAGNOSIS — N17.9 ACUTE KIDNEY FAILURE, UNSPECIFIED: ICD-10-CM

## 2017-02-19 DIAGNOSIS — Z79.51 LONG TERM (CURRENT) USE OF INHALED STEROIDS: ICD-10-CM

## 2017-02-19 DIAGNOSIS — I25.10 ATHEROSCLEROTIC HEART DISEASE OF NATIVE CORONARY ARTERY WITHOUT ANGINA PECTORIS: ICD-10-CM

## 2017-02-19 DIAGNOSIS — Z79.02 LONG TERM (CURRENT) USE OF ANTITHROMBOTICS/ANTIPLATELETS: ICD-10-CM

## 2017-02-19 DIAGNOSIS — N40.0 BENIGN PROSTATIC HYPERPLASIA WITHOUT LOWER URINARY TRACT SYMPTOMS: ICD-10-CM

## 2017-02-19 DIAGNOSIS — Z96.0 PRESENCE OF UROGENITAL IMPLANTS: ICD-10-CM

## 2017-02-19 DIAGNOSIS — D64.9 ANEMIA, UNSPECIFIED: ICD-10-CM

## 2017-02-19 DIAGNOSIS — I50.9 HEART FAILURE, UNSPECIFIED: ICD-10-CM

## 2017-02-19 DIAGNOSIS — R53.1 WEAKNESS: ICD-10-CM

## 2017-02-19 DIAGNOSIS — Z95.5 PRESENCE OF CORONARY ANGIOPLASTY IMPLANT AND GRAFT: ICD-10-CM

## 2017-02-19 DIAGNOSIS — E03.9 HYPOTHYROIDISM, UNSPECIFIED: ICD-10-CM

## 2017-02-19 DIAGNOSIS — N17.0 ACUTE KIDNEY FAILURE WITH TUBULAR NECROSIS: ICD-10-CM

## 2017-02-19 DIAGNOSIS — D63.1 ANEMIA IN CHRONIC KIDNEY DISEASE: ICD-10-CM

## 2017-02-19 DIAGNOSIS — Z79.2 LONG TERM (CURRENT) USE OF ANTIBIOTICS: ICD-10-CM

## 2017-02-19 DIAGNOSIS — I13.0 HYPERTENSIVE HEART AND CHRONIC KIDNEY DISEASE WITH HEART FAILURE AND STAGE 1 THROUGH STAGE 4 CHRONIC KIDNEY DISEASE, OR UNSPECIFIED CHRONIC KIDNEY DISEASE: ICD-10-CM

## 2017-02-19 DIAGNOSIS — D01.0 CARCINOMA IN SITU OF COLON: ICD-10-CM

## 2017-02-19 DIAGNOSIS — Z28.21 IMMUNIZATION NOT CARRIED OUT BECAUSE OF PATIENT REFUSAL: ICD-10-CM

## 2017-02-19 DIAGNOSIS — E87.5 HYPERKALEMIA: ICD-10-CM

## 2017-02-19 DIAGNOSIS — E46 UNSPECIFIED PROTEIN-CALORIE MALNUTRITION: ICD-10-CM

## 2017-05-11 NOTE — DISCHARGE NOTE ADULT - PROVIDER TOKENS
Jose Castellon LMSW TOKEN:'[id number]',TOKEN:'[id number]',TOKEN:'[id number]' TOKEN:'[id number]:'[id number]',TOKEN:'[id number]',FREE:[LAST:[dr Nataliya MD],PHONE:[phone]],FAX:[(   )    -],ADDRESS:[[address] [place] [address] call for appointment]]

## 2018-07-12 NOTE — OCCUPATIONAL THERAPY INITIAL EVALUATION ADULT - PATIENT/FAMILY/SIGNIFICANT OTHER GOALS STATEMENT, OT EVAL
1. Continue Flomax  2. Continue vaginal Valium twice daily  3. We will reassess your symptoms in 8 weeks  4. Complete a voiding diary just prior to return visit  5. Followu in 8 weeks and COME WITH A FULL BLADDER   Mr. Sharma wants to go home to his daughter.

## 2020-09-06 NOTE — DISCHARGE NOTE ADULT - FUNCTIONAL SCREEN CURRENT LEVEL: BATHING, MLM
Assessment and Plan





- Patient Problems


(1) S/P 


Current Visit: No   Status: Acute   


Plan to address problem: 


Continue routine PP orders


 Keep dressing clean and dry


 Anticipate d/c home in 24-48 hrs if stable


 








Subjective





- Subjective


Date of service: 20


Principal diagnosis: S/P repeat C/S w BTL; POD#1


Interval history: 





See admission H & P; OB operative note and PP progress notes


Patient reports: appetite normal, voiding normally, pain well controlled, 

flatus, ambulating normally


: doing well, bottle feeding (and breastfeeding)





Objective





- Vital Signs


Latest vital signs: 


                                   Vital Signs











  Temp Pulse Resp BP BP Pulse Ox


 


 20 10:04    20   


 


 20 08:40  98.1 F  66  20   101/64 


 


 20 06:12  97.8 F  72  18  98/60   98


 


 20 01:29  98.0 F  67  18  110/66   97


 


 20 21:59  97.5 F L  64  20  120/73   98


 


 20 17:36  97.9 F  67  16   118/72  100


 


 20 16:45   66  15  123/77  


 


 20 16:20   68  14  115/76  


 


 20 16:15   64  17  124/78  


 


 20 16:05   64  12  126/81  


 


 20 16:00   67  15  112/56  


 


 20 15:55   65  15  114/61  


 


 20 15:50   66  16  110/64  


 


 20 15:42  97.5 F L  67  32 H  99/49  


 


 20 12:49   102 H     96


 


 20 12:44   91 H     97


 


 20 12:41   97 H     93


 


 20 12:39   90     98


 


 20 12:34   92 H     97


 


 20 12:28   85     97


 


 20 12:23   85     97


 


 20 12:18   94 H     98


 


 20 12:13   78     97


 


 20 12:09  98.3 F  81  20  103/61  103/61  97


 


 20 12:08   83     97


 


 20 12:03   84     97


 


 20 11:58   80     96


 


 20 11:53   80     97








                                Intake and Output











 09/03/20 09/04/20 09/04/20





 23:59 07:59 15:59


 


Intake Total 360 360 320


 


Output Total 130 900 400


 


Balance 230 -540 -80


 


Intake:   


 


    


 


  Oral   320


 


  Intake, Free Water 260 360 


 


Output:   


 


  Urine 130 900 400


 


    Indwelling Catheter  600 


 


    Uretheral (Kan) 30  


 


    Void  300 400


 


Other:   


 


  Total, Intake Amount   320


 


  Total, Output Amount  300 400


 


  # Voids   


 


    Void  1 2














- Exam


Breasts: Present: normal


Cardiovascular: Present: Regular rate


Lungs: Present: Normal air movement


Abdomen: Present: soft, tenderness


Uterus: Present: firm, fundal height below umbilicus (U-1)


Incision: Present: dressed (no shadow drainage or bleeding noted)





- Labs


Labs: 


                              Abnormal lab results











  20 Range/Units





  12:12 


 


MCHC  35 H  (30-34)  %
Assessment and Plan


A: Postpartum/postop day 3. 


Anemia.


P: Discharge patient home today. Discussed with patient postpartum/postop 

discharge instructions and warning signs. Advised patient to continue taking her

prenatal vitamins and iron supplements at home. Advised patient to avoid 

lifting, sexual intercourse, and housework. Advised patient to follow up at 

[facility] in 1 week for incision check. Patient voiced understanding of 

all instructions. 








Subjective





- Subjective


Date of service: 20


Principal diagnosis: S/P repeat C/S w BTL; POD#3


Interval history: 


Postpartum postop day 3 S/P  section with BTL.


Patient desires discharge home. 





Patient reports: appetite normal, voiding normally, pain well controlled, 

flatus, ambulating normally, no dizzy ambulation, no nauseated


: doing well, nursing well





Objective





- Vital Signs


Latest vital signs: 


                                   Vital Signs











  Temp Pulse Resp BP Pulse Ox


 


 20 12:10  98.4 F  87  16  122/73  98


 


 20 08:25  98.5 F  72  16  104/65  96


 


 20 00:42  98.2 F  72  20  105/66  97


 


 20 17:55    20  


 


 20 16:21  98.7 F  98 H  20  108/73  96








                                Intake and Output











 20





 23:59 07:59 15:59


 


Intake Total 360 120 120


 


Balance 360 120 120


 


Intake:   


 


  Oral 360 120 120


 


Other:   


 


  Total, Intake Amount 120 120 120


 


  # Voids   


 


    Void 1 1 1














- Exam


Cardiovascular: Present: Regular rate, Normal S1, Normal S2, No murmurs


Lungs: Present: Clear to auscultation


Abdomen: Present: normal appearance, soft, normal bowel sounds.  Absent: 

distention, tenderness, guarding, rigidity


Uterus: Present: normal, firm, fundal height below umbilicus.  Absent: 

bogginess, tenderness


Extremities: Present: normal.  Absent: tenderness, edema


Incision: Present: normal, dry, intact
Assessment and Plan


A: Postpatum day 2 S/P LTCS with BTL. 


P: Continue current management. Anticipate discharge tomorrow at home. 








Subjective





- Subjective


Date of service: 20


Principal diagnosis: S/P repeat C/S w BTL; POD#2


Interval history: 


Postpartum/postop day 2 S/P repeat  section with BTL.





Patient reports: appetite normal, voiding normally, pain well controlled, 

flatus, ambulating normally, no dizzy ambulation, no nauseated


: doing well





Objective





- Vital Signs


Latest vital signs: 


                                   Vital Signs











  Temp Pulse Resp BP Pulse Ox


 


 20 16:21  98.7 F  98 H  20  108/73  96


 


 20 10:36    20  


 


 20 08:18  98.0 F  77  20  97/50  97


 


 20 00:14  98.2 F  80  20  101/59  97








                                Intake and Output











 20





 07:59 15:59 23:59


 


Intake Total  360 


 


Balance  360 


 


Intake:   


 


  Oral  360 


 


Other:   


 


  Total, Intake Amount  120 


 


  # Voids   


 


    Void  1 














- Exam


Cardiovascular: Present: Regular rate, Normal S1, Normal S2


Lungs: Present: Clear to auscultation


Abdomen: Present: normal appearance, soft, normal bowel sounds.  Absent: 

distention, tenderness, guarding, rigidity


Uterus: Present: normal, firm, fundal height below umbilicus.  Absent: 

bogginess, tenderness


Extremities: Present: normal.  Absent: tenderness


Incision: Present: normal
Regional Anesthesia Block





- Regional Anesthesia Block


Start Time: 16:30


Stop Time: 16:35


Performed By:: XAVIER CABRERA


Procedure: 





U/S guided bilateral tap block performed for post-operative pain requested by 

Dr. Koenig. H&P & labs reviewed.  Procedure explained, questions answered, consent

obtained.  Patient in the supine position with ekg, blood pressure cuff and 

pulse ox on and working in PACU. Timeout performed immediately before start of 

procedure. Probe placed in the mid-axillary line and the external oblique, 

internal oblique, and transverse abdominus muscles identified. Skin was cleansed

with 0.5% Chlorahexadine and allowed to dry. A 4" 20 G Rivera echogenic needle 

was advanced in plane until the tip was in the fascial plane between the 

internal oblique and the transverse abdominus. After negative aspiration 35 

ml/side of [30 ml 0.5% Bupivacaine], [50 mcg dexmedetomidine], [8 mg 

dexamethasone], and [40 ml sterile saline] was injected in 5 ml increments with 

negative aspiration in between. Patient tolerated procedure well. Rosalinda SRNA
Subjective





- Subjective


Date of service: 20


Principal diagnosis: S/P repeat C/S w BTL; POD#2


Interval history: 


Postpartum/postop day 2 S/P repeat  section with BTL.








Objective





- Vital Signs


Latest vital signs: 


                                   Vital Signs











  Temp Pulse Resp BP BP Pulse Ox


 


 20 10:36    20   


 


 20 08:18  98.0 F  77  20  97/50   97


 


 20 00:14  98.2 F  80  20  101/59   97


 


 20 16:50  98.1 F  80  20   112/72 








                                Intake and Output











 20





 07:59 15:59 23:59


 


Intake Total  360 


 


Balance  360 


 


Intake:   


 


  Oral  360 


 


Other:   


 


  Total, Intake Amount  120 


 


  # Voids   


 


    Void  1
(4) completely dependent

## 2021-01-21 NOTE — H&P ADULT. - ATTENDING COMMENTS
Full Thickness Lip Wedge Repair (Flap) Text: Given the location of the defect and the proximity to free margins a full thickness wedge repair was deemed most appropriate.  Using a sterile surgical marker, the appropriate repair was drawn incorporating the defect and placing the expected incisions perpendicular to the vermilion border.  The vermilion border was also meticulously outlined to ensure appropriate reapproximation during the repair.  The area thus outlined was incised through and through with a #15 scalpel blade.  The muscularis and dermis were reaproximated with deep sutures following hemostasis. Care was taken to realign the vermilion border before proceeding with the superficial closure.  Once the vermilion was realigned the superfical and mucosal closure was finished. 89 yr old Black male with PMH of  BPH, ARF, chronic Salcido catheter, HTN, CAD 3 stents, CHF recent Echo 11/4/2016 EF 45-50% with grade II diastolic dysfunction  presenting with hypotension, malaise and hematuria , found to be in sepsis on arrival to ED, also with uptrending creatinine, ROSAURA likely pre renal in setting of sepsis.  patient seen and examined, labs, imaging reviewed  Agree with above h and P  Broad Spectrum IV Abx, pending cultures, dose Abx as per creat clearance  ID, Urology and Nephrology consult.  ICD venodyn boots for DVT Px, no heparin or lovenox given active hematuria

## 2021-06-05 NOTE — DISCHARGE NOTE ADULT - CLICK TO LAUNCH ORM
Patient: Kwaku Todd  Procedure(s):  COLONOSCOPY  Anesthesia type: MAC    Patient location: Phase II Recovery  Last vitals:   Vitals Value Taken Time   /77 1/24/2020  2:30 PM   Temp  1/24/2020  2:53 PM   Pulse 59 1/24/2020  2:50 PM   Resp 16 1/24/2020  2:30 PM   SpO2 96 % 1/24/2020  2:50 PM   Vitals shown include unvalidated device data.  Post vital signs: stable  Level of consciousness: awake and responds to simple questions  Post-anesthesia pain: pain controlled  Post-anesthesia nausea and vomiting: no  Pulmonary: unassisted, return to baseline  Cardiovascular: stable and blood pressure at baseline  Hydration: adequate  Anesthetic events: no    QCDR Measures:  ASA# 11 - Danielle-op Cardiac Arrest: ASA11B - Patient did NOT experience unanticipated cardiac arrest  ASA# 12 - Danielle-op Mortality Rate: ASA12B - Patient did NOT die  ASA# 13 - PACU Re-Intubation Rate: ASA13B - Patient did NOT require a new airway mgmt  ASA# 10 - Composite Anes Safety: ASA10A - No serious adverse event    Additional Notes:  
.

## 2022-08-19 NOTE — CONSULT NOTE ADULT - CONSULT REQUESTED BY NAME
Nataliya This is a 60 year old male with no PMHx and surgical Hx of appendectomy, p/w c/o abd pain, found to have unilateral inguinal hernia on CT scan , who is now scheduled for Robotic assisted Left inguinal hernia repair with mesh possible right on 9/1/22 with Dr Hinojosa. This is a 60 year old male with no PMHx and surgical Hx of appendectomy, p/w c/o abd pain, found to have unilateral inguinal hernia on CT scan , who is now scheduled for Robotic assisted Left inguinal hernia repair with mesh possible right on 9/1/22 with Dr Hinojosa.    Tristen bangura 2

## 2023-05-29 NOTE — PATIENT PROFILE ADULT. - MEDICATION ADMINISTRATION INFO, PROFILE
" Emergency Department TeleTriage Encounter Note      CHIEF COMPLAINT    Chief Complaint   Patient presents with    Hypertension     States BP has been high, took meds today. Also reports low abdominal pain and low back pain x 3 days, tingling pain when urinating.        VITAL SIGNS   Initial Vitals [05/29/23 1713]   BP Pulse Resp Temp SpO2   (!) 191/103 80 16 98.2 °F (36.8 °C) 97 %      MAP       --            ALLERGIES    Review of patient's allergies indicates:  No Known Allergies    PROVIDER TRIAGE NOTE  This is a teletriage evaluation of a 56 y.o. male presenting to the ED complaining of elevated BP.  Reports compliance with BP medications.  No CP or SOB.  Did have headache on the way to the ED.  Also reports lower abd pain and "tingling" with urination.  Has had low back pain.     Well-appearing, no distress.     Initial orders will be placed and care will be transferred to an alternate provider when patient is roomed for a full evaluation. Any additional orders and the final disposition will be determined by that provider.         ORDERS  Labs Reviewed   URINALYSIS, REFLEX TO URINE CULTURE       ED Orders (720h ago, onward)      Start Ordered     Status Ordering Provider    05/29/23 1729 05/29/23 1728  EKG 12-lead  Once         Ordered ÁNGEL CONTRERAS    05/29/23 1729 05/29/23 1728  Urinalysis, Reflex to Urine Culture Urine, Clean Catch  STAT         Ordered ÁNGEL CONTRERAS              Virtual Visit Note: The provider triage portion of this emergency department evaluation and documentation was performed via [employer], a HIPAA-compliant telemedicine application, in concert with a tele-presenter in the room. A face to face patient evaluation with one of my colleagues will occur once the patient is placed in an emergency department room.      DISCLAIMER: This note was prepared with [employer] voice recognition transcription software. Garbled syntax, mangled pronouns, and other bizarre " constructions may be attributed to that software system.     no concerns

## 2024-08-16 NOTE — PATIENT PROFILE ADULT. - CAREGIVER ADDRESS
Radiology Post-Procedure Note    Pre Op Diagnosis: Ascites  Post Op Diagnosis: Same    Procedure: Ultrasound Guided Paracentesis    Procedure performed by: Diana Hewitt PA-C    Written Informed Consent Obtained: Yes  Specimen Removed: YES (yellow, clear)  Estimated Blood Loss: Minimal    Findings:   Successful paracentesis from RLQ.  Albumin administered PRN per protocol.    Patient tolerated procedure well.    Diana Hewitt PA-C  Interventional Radiology  [phone]      
[phone] [phone]

## 2025-03-02 NOTE — ED ADULT NURSE NOTE - NS ED NURSE RECORD ANOTHER HT AND WT
Code fall ,level1 activated, PA at bedside assessing the pt. fall debrief form fill out, pt. educated on fall prevention protocol, pt. verbalizes understanding, bed alarm activated. Yes